# Patient Record
Sex: FEMALE | Race: WHITE | NOT HISPANIC OR LATINO | Employment: OTHER | URBAN - METROPOLITAN AREA
[De-identification: names, ages, dates, MRNs, and addresses within clinical notes are randomized per-mention and may not be internally consistent; named-entity substitution may affect disease eponyms.]

---

## 2023-12-01 ENCOUNTER — ANCILLARY PROCEDURE (OUTPATIENT)
Dept: RADIOLOGY | Facility: CLINIC | Age: 68
End: 2023-12-01
Payer: MEDICARE

## 2023-12-01 VITALS — HEIGHT: 62 IN | WEIGHT: 195 LBS | BODY MASS INDEX: 35.88 KG/M2

## 2023-12-01 DIAGNOSIS — Z12.39 ENCOUNTER FOR OTHER SCREENING FOR MALIGNANT NEOPLASM OF BREAST: ICD-10-CM

## 2023-12-01 PROCEDURE — 77067 SCR MAMMO BI INCL CAD: CPT | Performed by: RADIOLOGY

## 2023-12-01 PROCEDURE — 77067 SCR MAMMO BI INCL CAD: CPT

## 2023-12-01 PROCEDURE — 77063 BREAST TOMOSYNTHESIS BI: CPT | Performed by: RADIOLOGY

## 2024-12-04 ENCOUNTER — HOSPITAL ENCOUNTER (OUTPATIENT)
Dept: RADIOLOGY | Facility: CLINIC | Age: 69
Discharge: HOME | End: 2024-12-04
Payer: MEDICARE

## 2024-12-04 VITALS — WEIGHT: 190 LBS | HEIGHT: 62 IN | BODY MASS INDEX: 34.96 KG/M2

## 2024-12-04 DIAGNOSIS — Z12.39 ENCOUNTER FOR OTHER SCREENING FOR MALIGNANT NEOPLASM OF BREAST: ICD-10-CM

## 2024-12-04 PROCEDURE — 77063 BREAST TOMOSYNTHESIS BI: CPT | Performed by: RADIOLOGY

## 2024-12-04 PROCEDURE — 77067 SCR MAMMO BI INCL CAD: CPT | Performed by: RADIOLOGY

## 2024-12-04 PROCEDURE — 77067 SCR MAMMO BI INCL CAD: CPT

## 2025-03-17 ENCOUNTER — HOSPITAL ENCOUNTER (OUTPATIENT)
Dept: CARDIOLOGY | Facility: HOSPITAL | Age: 70
Discharge: HOME | End: 2025-03-17
Payer: MEDICARE

## 2025-03-17 ENCOUNTER — APPOINTMENT (OUTPATIENT)
Dept: RADIOLOGY | Facility: HOSPITAL | Age: 70
End: 2025-03-17
Payer: MEDICARE

## 2025-03-17 ENCOUNTER — APPOINTMENT (OUTPATIENT)
Dept: CARDIOLOGY | Facility: HOSPITAL | Age: 70
End: 2025-03-17
Payer: MEDICARE

## 2025-03-17 ENCOUNTER — HOSPITAL ENCOUNTER (OUTPATIENT)
Facility: HOSPITAL | Age: 70
Setting detail: OBSERVATION
Discharge: HOME | End: 2025-03-18
Attending: STUDENT IN AN ORGANIZED HEALTH CARE EDUCATION/TRAINING PROGRAM | Admitting: STUDENT IN AN ORGANIZED HEALTH CARE EDUCATION/TRAINING PROGRAM
Payer: MEDICARE

## 2025-03-17 DIAGNOSIS — R53.1 EPISODE OF GENERALIZED WEAKNESS: ICD-10-CM

## 2025-03-17 DIAGNOSIS — R00.2 PALPITATIONS: Primary | ICD-10-CM

## 2025-03-17 DIAGNOSIS — R61 DIAPHORESIS: ICD-10-CM

## 2025-03-17 DIAGNOSIS — R07.9 CHEST PAIN: ICD-10-CM

## 2025-03-17 DIAGNOSIS — R06.09 DOE (DYSPNEA ON EXERTION): ICD-10-CM

## 2025-03-17 PROBLEM — F32.A DEPRESSION: Status: ACTIVE | Noted: 2025-03-17

## 2025-03-17 PROBLEM — E78.5 HLD (HYPERLIPIDEMIA): Status: ACTIVE | Noted: 2025-03-17

## 2025-03-17 LAB
ALBUMIN SERPL BCP-MCNC: 4.4 G/DL (ref 3.4–5)
ALP SERPL-CCNC: 75 U/L (ref 33–136)
ALT SERPL W P-5'-P-CCNC: 18 U/L (ref 7–45)
ANION GAP SERPL CALC-SCNC: 15 MMOL/L (ref 10–20)
APPEARANCE UR: CLEAR
APTT PPP: 31 SECONDS (ref 26–36)
AST SERPL W P-5'-P-CCNC: 18 U/L (ref 9–39)
BASOPHILS # BLD AUTO: 0.02 X10*3/UL (ref 0–0.1)
BASOPHILS NFR BLD AUTO: 0.2 %
BILIRUB SERPL-MCNC: 0.4 MG/DL (ref 0–1.2)
BILIRUB UR STRIP.AUTO-MCNC: NEGATIVE MG/DL
BNP SERPL-MCNC: 20 PG/ML (ref 0–99)
BUN SERPL-MCNC: 15 MG/DL (ref 6–23)
CALCIUM SERPL-MCNC: 9.7 MG/DL (ref 8.6–10.3)
CARDIAC TROPONIN I PNL SERPL HS: <3 NG/L (ref 0–13)
CARDIAC TROPONIN I PNL SERPL HS: <3 NG/L (ref 0–13)
CHLORIDE SERPL-SCNC: 105 MMOL/L (ref 98–107)
CO2 SERPL-SCNC: 24 MMOL/L (ref 21–32)
COLOR UR: COLORLESS
CREAT SERPL-MCNC: 0.79 MG/DL (ref 0.5–1.05)
D DIMER PPP FEU-MCNC: 455 NG/ML FEU
EGFRCR SERPLBLD CKD-EPI 2021: 81 ML/MIN/1.73M*2
EOSINOPHIL # BLD AUTO: 0.01 X10*3/UL (ref 0–0.7)
EOSINOPHIL NFR BLD AUTO: 0.1 %
ERYTHROCYTE [DISTWIDTH] IN BLOOD BY AUTOMATED COUNT: 13.3 % (ref 11.5–14.5)
FLUAV RNA RESP QL NAA+PROBE: NOT DETECTED
FLUBV RNA RESP QL NAA+PROBE: NOT DETECTED
GLUCOSE SERPL-MCNC: 120 MG/DL (ref 74–99)
GLUCOSE UR STRIP.AUTO-MCNC: ABNORMAL MG/DL
HCT VFR BLD AUTO: 46.6 % (ref 36–46)
HGB BLD-MCNC: 15.4 G/DL (ref 12–16)
IMM GRANULOCYTES # BLD AUTO: 0.04 X10*3/UL (ref 0–0.7)
IMM GRANULOCYTES NFR BLD AUTO: 0.5 % (ref 0–0.9)
INR PPP: 1.1 (ref 0.9–1.1)
KETONES UR STRIP.AUTO-MCNC: NEGATIVE MG/DL
LEUKOCYTE ESTERASE UR QL STRIP.AUTO: NEGATIVE
LYMPHOCYTES # BLD AUTO: 1.35 X10*3/UL (ref 1.2–4.8)
LYMPHOCYTES NFR BLD AUTO: 16.1 %
MAGNESIUM SERPL-MCNC: 2.11 MG/DL (ref 1.6–2.4)
MCH RBC QN AUTO: 26.8 PG (ref 26–34)
MCHC RBC AUTO-ENTMCNC: 33 G/DL (ref 32–36)
MCV RBC AUTO: 81 FL (ref 80–100)
MONOCYTES # BLD AUTO: 0.31 X10*3/UL (ref 0.1–1)
MONOCYTES NFR BLD AUTO: 3.7 %
NEUTROPHILS # BLD AUTO: 6.64 X10*3/UL (ref 1.2–7.7)
NEUTROPHILS NFR BLD AUTO: 79.4 %
NITRITE UR QL STRIP.AUTO: NEGATIVE
NRBC BLD-RTO: 0 /100 WBCS (ref 0–0)
PH UR STRIP.AUTO: 6 [PH]
PLATELET # BLD AUTO: 217 X10*3/UL (ref 150–450)
POTASSIUM SERPL-SCNC: 3.7 MMOL/L (ref 3.5–5.3)
PROT SERPL-MCNC: 7.4 G/DL (ref 6.4–8.2)
PROT UR STRIP.AUTO-MCNC: NEGATIVE MG/DL
PROTHROMBIN TIME: 12.4 SECONDS (ref 9.8–12.4)
RBC # BLD AUTO: 5.74 X10*6/UL (ref 4–5.2)
RBC # UR STRIP.AUTO: NEGATIVE MG/DL
SARS-COV-2 RNA RESP QL NAA+PROBE: NOT DETECTED
SODIUM SERPL-SCNC: 140 MMOL/L (ref 136–145)
SP GR UR STRIP.AUTO: 1
UROBILINOGEN UR STRIP.AUTO-MCNC: NORMAL MG/DL
WBC # BLD AUTO: 8.4 X10*3/UL (ref 4.4–11.3)

## 2025-03-17 PROCEDURE — 83735 ASSAY OF MAGNESIUM: CPT

## 2025-03-17 PROCEDURE — 84484 ASSAY OF TROPONIN QUANT: CPT

## 2025-03-17 PROCEDURE — 85610 PROTHROMBIN TIME: CPT

## 2025-03-17 PROCEDURE — 83880 ASSAY OF NATRIURETIC PEPTIDE: CPT

## 2025-03-17 PROCEDURE — 87636 SARSCOV2 & INF A&B AMP PRB: CPT

## 2025-03-17 PROCEDURE — G0378 HOSPITAL OBSERVATION PER HR: HCPCS

## 2025-03-17 PROCEDURE — 85730 THROMBOPLASTIN TIME PARTIAL: CPT

## 2025-03-17 PROCEDURE — 71045 X-RAY EXAM CHEST 1 VIEW: CPT | Performed by: RADIOLOGY

## 2025-03-17 PROCEDURE — 93005 ELECTROCARDIOGRAM TRACING: CPT

## 2025-03-17 PROCEDURE — 71045 X-RAY EXAM CHEST 1 VIEW: CPT

## 2025-03-17 PROCEDURE — 2500000002 HC RX 250 W HCPCS SELF ADMINISTERED DRUGS (ALT 637 FOR MEDICARE OP, ALT 636 FOR OP/ED): Mod: MUE | Performed by: STUDENT IN AN ORGANIZED HEALTH CARE EDUCATION/TRAINING PROGRAM

## 2025-03-17 PROCEDURE — 99285 EMERGENCY DEPT VISIT HI MDM: CPT | Performed by: STUDENT IN AN ORGANIZED HEALTH CARE EDUCATION/TRAINING PROGRAM

## 2025-03-17 PROCEDURE — 80053 COMPREHEN METABOLIC PANEL: CPT

## 2025-03-17 PROCEDURE — 85379 FIBRIN DEGRADATION QUANT: CPT

## 2025-03-17 PROCEDURE — 99222 1ST HOSP IP/OBS MODERATE 55: CPT | Performed by: STUDENT IN AN ORGANIZED HEALTH CARE EDUCATION/TRAINING PROGRAM

## 2025-03-17 PROCEDURE — 85025 COMPLETE CBC W/AUTO DIFF WBC: CPT

## 2025-03-17 PROCEDURE — 2500000004 HC RX 250 GENERAL PHARMACY W/ HCPCS (ALT 636 FOR OP/ED): Performed by: STUDENT IN AN ORGANIZED HEALTH CARE EDUCATION/TRAINING PROGRAM

## 2025-03-17 PROCEDURE — 36415 COLL VENOUS BLD VENIPUNCTURE: CPT

## 2025-03-17 PROCEDURE — 96372 THER/PROPH/DIAG INJ SC/IM: CPT | Performed by: STUDENT IN AN ORGANIZED HEALTH CARE EDUCATION/TRAINING PROGRAM

## 2025-03-17 PROCEDURE — 81003 URINALYSIS AUTO W/O SCOPE: CPT

## 2025-03-17 RX ORDER — SIMVASTATIN 20 MG/1
20 TABLET, FILM COATED ORAL NIGHTLY
COMMUNITY

## 2025-03-17 RX ORDER — TALC
3 POWDER (GRAM) TOPICAL NIGHTLY PRN
Status: DISCONTINUED | OUTPATIENT
Start: 2025-03-17 | End: 2025-03-18 | Stop reason: HOSPADM

## 2025-03-17 RX ORDER — GUAIFENESIN 600 MG/1
600 TABLET, EXTENDED RELEASE ORAL EVERY 12 HOURS PRN
Status: DISCONTINUED | OUTPATIENT
Start: 2025-03-17 | End: 2025-03-18 | Stop reason: HOSPADM

## 2025-03-17 RX ORDER — POTASSIUM CHLORIDE 20 MEQ/1
40 TABLET, EXTENDED RELEASE ORAL ONCE
Status: COMPLETED | OUTPATIENT
Start: 2025-03-17 | End: 2025-03-17

## 2025-03-17 RX ORDER — ENOXAPARIN SODIUM 100 MG/ML
40 INJECTION SUBCUTANEOUS EVERY 24 HOURS
Status: DISCONTINUED | OUTPATIENT
Start: 2025-03-17 | End: 2025-03-18 | Stop reason: HOSPADM

## 2025-03-17 RX ORDER — CALC/MAG/B COMPLEX/D3/HERB 61
15 TABLET ORAL
COMMUNITY

## 2025-03-17 RX ORDER — BISACODYL 5 MG
10 TABLET, DELAYED RELEASE (ENTERIC COATED) ORAL DAILY PRN
Status: DISCONTINUED | OUTPATIENT
Start: 2025-03-17 | End: 2025-03-18 | Stop reason: HOSPADM

## 2025-03-17 RX ORDER — BISMUTH SUBSALICYLATE 262 MG
1 TABLET,CHEWABLE ORAL DAILY
COMMUNITY

## 2025-03-17 RX ORDER — OMEGA-3-ACID ETHYL ESTERS 1 G/1
1 CAPSULE, LIQUID FILLED ORAL DAILY
COMMUNITY

## 2025-03-17 RX ORDER — CEPHRADINE 500 MG
1000 CAPSULE ORAL DAILY
COMMUNITY

## 2025-03-17 RX ORDER — ONDANSETRON HYDROCHLORIDE 2 MG/ML
4 INJECTION, SOLUTION INTRAVENOUS EVERY 8 HOURS PRN
Status: DISCONTINUED | OUTPATIENT
Start: 2025-03-17 | End: 2025-03-18 | Stop reason: HOSPADM

## 2025-03-17 RX ORDER — SIMVASTATIN 20 MG/1
20 TABLET, FILM COATED ORAL NIGHTLY
Status: DISCONTINUED | OUTPATIENT
Start: 2025-03-17 | End: 2025-03-18 | Stop reason: HOSPADM

## 2025-03-17 RX ORDER — PAROXETINE HYDROCHLORIDE 20 MG/1
20 TABLET, FILM COATED ORAL
COMMUNITY
Start: 2024-12-16

## 2025-03-17 RX ORDER — BISACODYL 10 MG/1
10 SUPPOSITORY RECTAL DAILY PRN
Status: DISCONTINUED | OUTPATIENT
Start: 2025-03-17 | End: 2025-03-18 | Stop reason: HOSPADM

## 2025-03-17 RX ORDER — PAROXETINE HYDROCHLORIDE 20 MG/1
20 TABLET, FILM COATED ORAL
Status: DISCONTINUED | OUTPATIENT
Start: 2025-03-18 | End: 2025-03-18 | Stop reason: HOSPADM

## 2025-03-17 RX ORDER — ONDANSETRON 4 MG/1
4 TABLET, FILM COATED ORAL EVERY 8 HOURS PRN
Status: DISCONTINUED | OUTPATIENT
Start: 2025-03-17 | End: 2025-03-18 | Stop reason: HOSPADM

## 2025-03-17 RX ORDER — BUTYROSPERMUM PARKII(SHEA BUTTER), SIMMONDSIA CHINENSIS (JOJOBA) SEED OIL, ALOE BARBADENSIS LEAF EXTRACT .01; 1; 3.5 G/100G; G/100G; G/100G
250 LIQUID TOPICAL DAILY
COMMUNITY

## 2025-03-17 RX ADMIN — ENOXAPARIN SODIUM 40 MG: 100 INJECTION SUBCUTANEOUS at 23:47

## 2025-03-17 RX ADMIN — POTASSIUM CHLORIDE 40 MEQ: 1500 TABLET, EXTENDED RELEASE ORAL at 23:47

## 2025-03-17 SDOH — SOCIAL STABILITY: SOCIAL INSECURITY: HAVE YOU HAD THOUGHTS OF HARMING ANYONE ELSE?: NO

## 2025-03-17 SDOH — SOCIAL STABILITY: SOCIAL INSECURITY: WERE YOU ABLE TO COMPLETE ALL THE BEHAVIORAL HEALTH SCREENINGS?: YES

## 2025-03-17 ASSESSMENT — COGNITIVE AND FUNCTIONAL STATUS - GENERAL
PATIENT BASELINE BEDBOUND: NO
MOBILITY SCORE: 24
MOBILITY SCORE: 24
DAILY ACTIVITIY SCORE: 24
DAILY ACTIVITIY SCORE: 24

## 2025-03-17 ASSESSMENT — LIFESTYLE VARIABLES
AUDIT-C TOTAL SCORE: 0
HOW OFTEN DO YOU HAVE 6 OR MORE DRINKS ON ONE OCCASION: NEVER
HOW MANY STANDARD DRINKS CONTAINING ALCOHOL DO YOU HAVE ON A TYPICAL DAY: PATIENT DOES NOT DRINK
HOW OFTEN DO YOU HAVE A DRINK CONTAINING ALCOHOL: NEVER
SUBSTANCE_ABUSE_PAST_12_MONTHS: NO
SKIP TO QUESTIONS 9-10: 1
AUDIT-C TOTAL SCORE: 0
PRESCIPTION_ABUSE_PAST_12_MONTHS: NO

## 2025-03-17 ASSESSMENT — ENCOUNTER SYMPTOMS
NAUSEA: 0
NERVOUS/ANXIOUS: 0
WOUND: 0
HEMATURIA: 0
MYALGIAS: 0
CHEST TIGHTNESS: 0
AGITATION: 0
COUGH: 0
DIFFICULTY URINATING: 0
CONSTIPATION: 0
FREQUENCY: 0
FLANK PAIN: 0
RHINORRHEA: 0
ACTIVITY CHANGE: 0
APPETITE CHANGE: 0
HEADACHES: 0
CONFUSION: 0
VOMITING: 0
SORE THROAT: 0
DIAPHORESIS: 1
SHORTNESS OF BREATH: 0
BACK PAIN: 0
DECREASED CONCENTRATION: 0
SINUS PAIN: 0
WEAKNESS: 0
COLOR CHANGE: 0
ABDOMINAL DISTENTION: 0
CHILLS: 0
NUMBNESS: 0
FATIGUE: 1
LIGHT-HEADEDNESS: 0
JOINT SWELLING: 0
STRIDOR: 0
PALPITATIONS: 1
DIARRHEA: 0
WHEEZING: 0
DIZZINESS: 0
DYSURIA: 0
ABDOMINAL PAIN: 0
APNEA: 0
ARTHRALGIAS: 0
FEVER: 0

## 2025-03-17 ASSESSMENT — COLUMBIA-SUICIDE SEVERITY RATING SCALE - C-SSRS
1. IN THE PAST MONTH, HAVE YOU WISHED YOU WERE DEAD OR WISHED YOU COULD GO TO SLEEP AND NOT WAKE UP?: NO
6. HAVE YOU EVER DONE ANYTHING, STARTED TO DO ANYTHING, OR PREPARED TO DO ANYTHING TO END YOUR LIFE?: NO
2. HAVE YOU ACTUALLY HAD ANY THOUGHTS OF KILLING YOURSELF?: NO

## 2025-03-17 ASSESSMENT — PAIN SCALES - GENERAL
PAINLEVEL_OUTOF10: 0 - NO PAIN
PAINLEVEL_OUTOF10: 0 - NO PAIN

## 2025-03-17 ASSESSMENT — ACTIVITIES OF DAILY LIVING (ADL)
HEARING - LEFT EAR: FUNCTIONAL
ADEQUATE_TO_COMPLETE_ADL: YES
WALKS IN HOME: INDEPENDENT
FEEDING YOURSELF: INDEPENDENT
GROOMING: INDEPENDENT
HEARING - RIGHT EAR: FUNCTIONAL
PATIENT'S MEMORY ADEQUATE TO SAFELY COMPLETE DAILY ACTIVITIES?: YES
TOILETING: INDEPENDENT
BATHING: INDEPENDENT
JUDGMENT_ADEQUATE_SAFELY_COMPLETE_DAILY_ACTIVITIES: YES
DRESSING YOURSELF: INDEPENDENT
LACK_OF_TRANSPORTATION: NO

## 2025-03-17 ASSESSMENT — PATIENT HEALTH QUESTIONNAIRE - PHQ9
1. LITTLE INTEREST OR PLEASURE IN DOING THINGS: NOT AT ALL
2. FEELING DOWN, DEPRESSED OR HOPELESS: NOT AT ALL
SUM OF ALL RESPONSES TO PHQ9 QUESTIONS 1 & 2: 0

## 2025-03-17 ASSESSMENT — PAIN - FUNCTIONAL ASSESSMENT
PAIN_FUNCTIONAL_ASSESSMENT: 0-10
PAIN_FUNCTIONAL_ASSESSMENT: 0-10

## 2025-03-17 NOTE — ED PROVIDER NOTES
"  Chief Complaint   Patient presents with    Dizziness     Pt states 5 episodes of dizziness, sweating and feeling \"jittery\" today.  Pt denies any Chest pain or shob        69-year-old female arrives to the emergency department the chief complaint of periods of palpitation, diaphoresis, lethargy.  The patient woke this morning feeling well, went to work at 830, approximately 830 had her first episode of what she complains as her heart fluttering in her chest, sweating to the point of sweat dripping from both sides of her brow, and lethargy where she felt listless.  Patient states that she would have an episode that lasted 10 to 15 minutes, partially resolved, and repeated self having 4 episodes from 8 30-11 30.  Patient went home, states that she felt faint while walking to her car, drove herself home and had another episode at approximately 1300 that was worse than all subsequent episodes.  These are new for the patient, however the patient does state that over the last year has felt intermittent fluttering in her chest.  At the time of initial assessment, the patient states that all her symptoms have resolved, the patient is pleasant, alert and orient x 4, and hemodynamically stable.  At no time did the patient endorse any chest pain however states that while she was having the fluttering she had difficulty catching her breath.  Patient has hyperlipidemia and depression as a medical history, denies any other significant medical history or other daily medications      History provided by:  Patient   used: No         PmHx, PsHx, Allergies, Family Hx, social Hx reviewed as documented    A complete 10 point review of systems was performed and is negative except for as mentioned in the HPI.    Physical Exam:    General: Patient is AAOx3, appears well developed, well nourished, is a good historian, answers questions appropriately    HEENT: head normocephalic, atraumatic, PERRLA, EOMs intact, " oropharynx without erythema or exudate, buccal mucosa intact without lesions, TMs unremarkable, nose is patent bilateral    Neck: supple, full ROM, negative for lymphadenopathy, JVD, thyromegaly, tracheal deviation, nuccal rigidity    Pulmonary: CTAB, no accessory muscle use, able to speak full clear sentences    Cardiac: HRRR, no murmurs, rubs or gallops    GI: soft, non-tender, non-distended, BS + x 4, no masses or organomegaly, no guarding or CVA tenderness noted, negative chairez's, mcburney's    Musculoskeletal: full weight bearing, ALVAREZ, no joint effusions, clubbing or edema noted    Skin: intact, no lesions or rashes noted, turgor is good.    Neuro: patient follow commands, cranial nerves 2-12 grossly intact, motor strengths 5/5 upper and lower extremities, DTR's and sensation are symmetrical. No focal deficits.    Rectal/: No urinary burning, urgency, change in frequency.  Patient has no rectal complaints        Medical Decision Making  This patient was seen, treated, and evaluated in conjunction with Dr. Ej Arana    Primary consideration for this patient given her presentation as well as her otherwise being healthy would be periods of arrhythmia such as atrial fibrillation, acute ACS, pulmonary embolism, electrolyte abnormality, blood count deficiency to name a few.  EKG, diagnostic blood work, urinalysis will be used to further evaluate    Patient's EKG was done on 3/17 at 1406, the EKG was interpreted by the attending physician as no STEMI, the EKG is interpreted by me shows a sinus rhythm with a rate of 75, WV interval of 144, and a prolonged QTc of 585 which is further concerning for possible episodic arrhythmia.     Patient moved from triage to main emergency department, placed on a cardiac monitor, approximately 30 minutes after being placed on cardiac monitor the patient states that she had another episode similar to that of the past lasting about 5 minutes, the patient endorsed diaphoresis,  lethargy, fluttering in the chest, continues to deny any chest pain.    The patient's diagnostic blood work including serial troponins and BNP were found to be negative for any acute abnormality including D-dimer VTE exclusion that was also negative    The patient's repeat EKG was done at 2038 on 3/17, the EKG was interpreted by the attending physician as no STEMI, the EKG is interpreted by me shows a sinus rhythm with a rate of 70, UT interval of 143 and a QTc of 421 which was found earlier to be prolonged    Although the patient's ED course is benign for acute abnormality, given the patient had an episode here in the emergency department, the patient has never had these in the past, and is otherwise healthy, patient will be further evaluated via observation stay.  Dr. Constantin Deng was consulted, the patient's events prior to arrival as well as her entire ED course were reviewed.  Patient will be kept on observation telemetry.        Amount and/or Complexity of Data Reviewed  Labs: ordered. Decision-making details documented in ED Course.  Radiology: ordered. Decision-making details documented in ED Course.  ECG/medicine tests: ordered. Decision-making details documented in ED Course.       Diagnoses as of 03/17/25 2056   Palpitations   Diaphoresis   Episode of generalized weakness       The patient has had the following imaging during this ER visit: XR CHEST 1 VIEW  INITIATE OBSERVATION STATUS  ED TO FLOOR BED REQUEST  MAY PARTICIPATE IN ROOM SERVICE  WEIGH PATIENT  MEASURE HEIGHT  ACTIVITY  VITAL SIGNS  SEQUENTIAL COMPRESSION DEVICE  FULL CODE  ADULT DIET  TELEMETRY MONITORING  IP CONSULT TO CARDIOLOGY     Patient History   History reviewed. No pertinent past medical history.  History reviewed. No pertinent surgical history.  Family History   Problem Relation Name Age of Onset    Breast cancer Mother  88    Ovarian cancer Mother's Sister  80     Social History     Tobacco Use    Smoking status: Never    Smokeless  tobacco: Never   Vaping Use    Vaping status: Never Used   Substance Use Topics    Alcohol use: Not on file    Drug use: Never       ED Triage Vitals [03/17/25 1355]   Temperature Heart Rate Respirations BP   37.2 °C (98.9 °F) 78 15 154/90      Pulse Ox Temp Source Heart Rate Source Patient Position   98 % Oral Monitor --      BP Location FiO2 (%)     -- --       Vitals:    03/17/25 1800 03/17/25 1921 03/17/25 2000 03/17/25 2011   BP: 135/65 147/80  117/77   Pulse: 77 80 73 71   Resp: (!) 26 11 (!) 21 12   Temp:       TempSrc:       SpO2: 98% 95% 96% 95%   Weight:       Height:                   PRAVIN Delaney-CNP  03/17/25 2057

## 2025-03-18 ENCOUNTER — APPOINTMENT (OUTPATIENT)
Dept: CARDIOLOGY | Facility: HOSPITAL | Age: 70
End: 2025-03-18
Payer: MEDICARE

## 2025-03-18 VITALS
OXYGEN SATURATION: 91 % | DIASTOLIC BLOOD PRESSURE: 78 MMHG | RESPIRATION RATE: 18 BRPM | HEIGHT: 63 IN | WEIGHT: 190 LBS | BODY MASS INDEX: 33.66 KG/M2 | TEMPERATURE: 97.6 F | SYSTOLIC BLOOD PRESSURE: 115 MMHG | HEART RATE: 83 BPM

## 2025-03-18 LAB
ANION GAP SERPL CALC-SCNC: 10 MMOL/L (ref 10–20)
AORTIC VALVE MEAN GRADIENT: 4 MMHG
AORTIC VALVE PEAK VELOCITY: 1.27 M/S
AV PEAK GRADIENT: 6 MMHG
AVA (PEAK VEL): 2.08 CM2
AVA (VTI): 1.99 CM2
BUN SERPL-MCNC: 15 MG/DL (ref 6–23)
CALCIUM SERPL-MCNC: 8.9 MG/DL (ref 8.6–10.3)
CHLORIDE SERPL-SCNC: 110 MMOL/L (ref 98–107)
CO2 SERPL-SCNC: 26 MMOL/L (ref 21–32)
CREAT SERPL-MCNC: 0.81 MG/DL (ref 0.5–1.05)
EGFRCR SERPLBLD CKD-EPI 2021: 79 ML/MIN/1.73M*2
EJECTION FRACTION APICAL 4 CHAMBER: 67.5
EJECTION FRACTION: 63 %
ERYTHROCYTE [DISTWIDTH] IN BLOOD BY AUTOMATED COUNT: 13.6 % (ref 11.5–14.5)
GLUCOSE SERPL-MCNC: 95 MG/DL (ref 74–99)
HCT VFR BLD AUTO: 40.1 % (ref 36–46)
HGB BLD-MCNC: 13.2 G/DL (ref 12–16)
HOLD SPECIMEN: NORMAL
LEFT ATRIUM VOLUME AREA LENGTH INDEX BSA: 26.5 ML/M2
LEFT VENTRICLE INTERNAL DIMENSION DIASTOLE: 4.2 CM (ref 3.5–6)
LEFT VENTRICULAR OUTFLOW TRACT DIAMETER: 1.8 CM
LV EJECTION FRACTION BIPLANE: 63 %
MAGNESIUM SERPL-MCNC: 2.14 MG/DL (ref 1.6–2.4)
MCH RBC QN AUTO: 27.1 PG (ref 26–34)
MCHC RBC AUTO-ENTMCNC: 32.9 G/DL (ref 32–36)
MCV RBC AUTO: 82 FL (ref 80–100)
MITRAL VALVE E/A RATIO: 1.01
NRBC BLD-RTO: 0 /100 WBCS (ref 0–0)
PLATELET # BLD AUTO: 189 X10*3/UL (ref 150–450)
POTASSIUM SERPL-SCNC: 3.8 MMOL/L (ref 3.5–5.3)
RBC # BLD AUTO: 4.87 X10*6/UL (ref 4–5.2)
RIGHT VENTRICLE FREE WALL PEAK S': 19.6 CM/S
RIGHT VENTRICLE PEAK SYSTOLIC PRESSURE: 39 MMHG
SODIUM SERPL-SCNC: 142 MMOL/L (ref 136–145)
TRICUSPID ANNULAR PLANE SYSTOLIC EXCURSION: 2.6 CM
TSH SERPL-ACNC: 3.5 MIU/L (ref 0.44–3.98)
WBC # BLD AUTO: 8 X10*3/UL (ref 4.4–11.3)

## 2025-03-18 PROCEDURE — 2500000004 HC RX 250 GENERAL PHARMACY W/ HCPCS (ALT 636 FOR OP/ED)

## 2025-03-18 PROCEDURE — 2500000002 HC RX 250 W HCPCS SELF ADMINISTERED DRUGS (ALT 637 FOR MEDICARE OP, ALT 636 FOR OP/ED): Mod: MUE | Performed by: STUDENT IN AN ORGANIZED HEALTH CARE EDUCATION/TRAINING PROGRAM

## 2025-03-18 PROCEDURE — 93306 TTE W/DOPPLER COMPLETE: CPT | Performed by: INTERNAL MEDICINE

## 2025-03-18 PROCEDURE — 99222 1ST HOSP IP/OBS MODERATE 55: CPT | Performed by: INTERNAL MEDICINE

## 2025-03-18 PROCEDURE — 93018 CV STRESS TEST I&R ONLY: CPT | Performed by: INTERNAL MEDICINE

## 2025-03-18 PROCEDURE — 93350 STRESS TTE ONLY: CPT | Performed by: INTERNAL MEDICINE

## 2025-03-18 PROCEDURE — 83735 ASSAY OF MAGNESIUM: CPT | Performed by: STUDENT IN AN ORGANIZED HEALTH CARE EDUCATION/TRAINING PROGRAM

## 2025-03-18 PROCEDURE — 2500000004 HC RX 250 GENERAL PHARMACY W/ HCPCS (ALT 636 FOR OP/ED): Performed by: INTERNAL MEDICINE

## 2025-03-18 PROCEDURE — 36415 COLL VENOUS BLD VENIPUNCTURE: CPT | Performed by: STUDENT IN AN ORGANIZED HEALTH CARE EDUCATION/TRAINING PROGRAM

## 2025-03-18 PROCEDURE — 85027 COMPLETE CBC AUTOMATED: CPT | Performed by: STUDENT IN AN ORGANIZED HEALTH CARE EDUCATION/TRAINING PROGRAM

## 2025-03-18 PROCEDURE — 99239 HOSP IP/OBS DSCHRG MGMT >30: CPT | Performed by: INTERNAL MEDICINE

## 2025-03-18 PROCEDURE — 84443 ASSAY THYROID STIM HORMONE: CPT | Performed by: INTERNAL MEDICINE

## 2025-03-18 PROCEDURE — C8929 TTE W OR WO FOL WCON,DOPPLER: HCPCS

## 2025-03-18 PROCEDURE — 93017 CV STRESS TEST TRACING ONLY: CPT

## 2025-03-18 PROCEDURE — 93016 CV STRESS TEST SUPVJ ONLY: CPT | Performed by: INTERNAL MEDICINE

## 2025-03-18 PROCEDURE — 80048 BASIC METABOLIC PNL TOTAL CA: CPT | Performed by: STUDENT IN AN ORGANIZED HEALTH CARE EDUCATION/TRAINING PROGRAM

## 2025-03-18 PROCEDURE — G0378 HOSPITAL OBSERVATION PER HR: HCPCS

## 2025-03-18 RX ADMIN — HUMAN ALBUMIN MICROSPHERES AND PERFLUTREN: 10; .22 INJECTION, SOLUTION INTRAVENOUS at 10:39

## 2025-03-18 RX ADMIN — PERFLUTREN 8 ML OF DILUTION: 6.52 INJECTION, SUSPENSION INTRAVENOUS at 12:03

## 2025-03-18 RX ADMIN — PAROXETINE 20 MG: 20 TABLET, FILM COATED ORAL at 06:53

## 2025-03-18 SDOH — HEALTH STABILITY: PHYSICAL HEALTH
HOW OFTEN DO YOU NEED TO HAVE SOMEONE HELP YOU WHEN YOU READ INSTRUCTIONS, PAMPHLETS, OR OTHER WRITTEN MATERIAL FROM YOUR DOCTOR OR PHARMACY?: PATIENT DECLINES TO RESPOND

## 2025-03-18 SDOH — ECONOMIC STABILITY: TRANSPORTATION INSECURITY: IN THE PAST 12 MONTHS, HAS LACK OF TRANSPORTATION KEPT YOU FROM MEDICAL APPOINTMENTS OR FROM GETTING MEDICATIONS?: NO

## 2025-03-18 SDOH — SOCIAL STABILITY: SOCIAL NETWORK
DO YOU BELONG TO ANY CLUBS OR ORGANIZATIONS SUCH AS CHURCH GROUPS, UNIONS, FRATERNAL OR ATHLETIC GROUPS, OR SCHOOL GROUPS?: PATIENT DECLINED

## 2025-03-18 SDOH — ECONOMIC STABILITY: FOOD INSECURITY
WITHIN THE PAST 12 MONTHS, YOU WORRIED THAT YOUR FOOD WOULD RUN OUT BEFORE YOU GOT THE MONEY TO BUY MORE.: PATIENT DECLINED

## 2025-03-18 SDOH — SOCIAL STABILITY: SOCIAL INSECURITY
WITHIN THE LAST YEAR, HAVE YOU BEEN KICKED, HIT, SLAPPED, OR OTHERWISE PHYSICALLY HURT BY YOUR PARTNER OR EX-PARTNER?: PATIENT DECLINED

## 2025-03-18 SDOH — ECONOMIC STABILITY: HOUSING INSECURITY: AT ANY TIME IN THE PAST 12 MONTHS, WERE YOU HOMELESS OR LIVING IN A SHELTER (INCLUDING NOW)?: NO

## 2025-03-18 SDOH — SOCIAL STABILITY: SOCIAL NETWORK: HOW OFTEN DO YOU ATTEND CHURCH OR RELIGIOUS SERVICES?: PATIENT DECLINED

## 2025-03-18 SDOH — SOCIAL STABILITY: SOCIAL INSECURITY
WITHIN THE LAST YEAR, HAVE YOU BEEN RAPED OR FORCED TO HAVE ANY KIND OF SEXUAL ACTIVITY BY YOUR PARTNER OR EX-PARTNER?: PATIENT DECLINED

## 2025-03-18 SDOH — ECONOMIC STABILITY: FOOD INSECURITY: WITHIN THE PAST 12 MONTHS, THE FOOD YOU BOUGHT JUST DIDN'T LAST AND YOU DIDN'T HAVE MONEY TO GET MORE.: PATIENT DECLINED

## 2025-03-18 SDOH — SOCIAL STABILITY: SOCIAL INSECURITY: ARE YOU MARRIED, WIDOWED, DIVORCED, SEPARATED, NEVER MARRIED, OR LIVING WITH A PARTNER?: PATIENT DECLINED

## 2025-03-18 SDOH — ECONOMIC STABILITY: FOOD INSECURITY: HOW HARD IS IT FOR YOU TO PAY FOR THE VERY BASICS LIKE FOOD, HOUSING, MEDICAL CARE, AND HEATING?: PATIENT DECLINED

## 2025-03-18 SDOH — SOCIAL STABILITY: SOCIAL INSECURITY: WITHIN THE LAST YEAR, HAVE YOU BEEN AFRAID OF YOUR PARTNER OR EX-PARTNER?: PATIENT DECLINED

## 2025-03-18 SDOH — SOCIAL STABILITY: SOCIAL NETWORK: HOW OFTEN DO YOU ATTEND MEETINGS OF THE CLUBS OR ORGANIZATIONS YOU BELONG TO?: PATIENT DECLINED

## 2025-03-18 SDOH — HEALTH STABILITY: MENTAL HEALTH: HOW OFTEN DO YOU HAVE SIX OR MORE DRINKS ON ONE OCCASION?: NEVER

## 2025-03-18 SDOH — SOCIAL STABILITY: SOCIAL NETWORK: IN A TYPICAL WEEK, HOW MANY TIMES DO YOU TALK ON THE PHONE WITH FAMILY, FRIENDS, OR NEIGHBORS?: PATIENT DECLINED

## 2025-03-18 SDOH — HEALTH STABILITY: PHYSICAL HEALTH: ON AVERAGE, HOW MANY MINUTES DO YOU ENGAGE IN EXERCISE AT THIS LEVEL?: PATIENT DECLINED

## 2025-03-18 SDOH — ECONOMIC STABILITY: HOUSING INSECURITY: IN THE LAST 12 MONTHS, WAS THERE A TIME WHEN YOU WERE NOT ABLE TO PAY THE MORTGAGE OR RENT ON TIME?: PATIENT DECLINED

## 2025-03-18 SDOH — SOCIAL STABILITY: SOCIAL NETWORK: HOW OFTEN DO YOU GET TOGETHER WITH FRIENDS OR RELATIVES?: PATIENT DECLINED

## 2025-03-18 SDOH — ECONOMIC STABILITY: HOUSING INSECURITY: IN THE PAST 12 MONTHS, HOW MANY TIMES HAVE YOU MOVED WHERE YOU WERE LIVING?: 0

## 2025-03-18 SDOH — HEALTH STABILITY: MENTAL HEALTH
DO YOU FEEL STRESS - TENSE, RESTLESS, NERVOUS, OR ANXIOUS, OR UNABLE TO SLEEP AT NIGHT BECAUSE YOUR MIND IS TROUBLED ALL THE TIME - THESE DAYS?: PATIENT DECLINED

## 2025-03-18 SDOH — HEALTH STABILITY: MENTAL HEALTH: HOW MANY DRINKS CONTAINING ALCOHOL DO YOU HAVE ON A TYPICAL DAY WHEN YOU ARE DRINKING?: PATIENT DOES NOT DRINK

## 2025-03-18 SDOH — ECONOMIC STABILITY: INCOME INSECURITY
IN THE PAST 12 MONTHS HAS THE ELECTRIC, GAS, OIL, OR WATER COMPANY THREATENED TO SHUT OFF SERVICES IN YOUR HOME?: PATIENT DECLINED

## 2025-03-18 SDOH — HEALTH STABILITY: MENTAL HEALTH: HOW OFTEN DO YOU HAVE A DRINK CONTAINING ALCOHOL?: NEVER

## 2025-03-18 SDOH — SOCIAL STABILITY: SOCIAL INSECURITY
WITHIN THE LAST YEAR, HAVE YOU BEEN HUMILIATED OR EMOTIONALLY ABUSED IN OTHER WAYS BY YOUR PARTNER OR EX-PARTNER?: PATIENT DECLINED

## 2025-03-18 SDOH — HEALTH STABILITY: PHYSICAL HEALTH
ON AVERAGE, HOW MANY DAYS PER WEEK DO YOU ENGAGE IN MODERATE TO STRENUOUS EXERCISE (LIKE A BRISK WALK)?: PATIENT DECLINED

## 2025-03-18 ASSESSMENT — COGNITIVE AND FUNCTIONAL STATUS - GENERAL
MOBILITY SCORE: 24
DAILY ACTIVITIY SCORE: 24

## 2025-03-18 ASSESSMENT — PAIN SCALES - WONG BAKER: WONGBAKER_NUMERICALRESPONSE: NO HURT

## 2025-03-18 ASSESSMENT — PAIN - FUNCTIONAL ASSESSMENT
PAIN_FUNCTIONAL_ASSESSMENT: 0-10
PAIN_FUNCTIONAL_ASSESSMENT: 0-10

## 2025-03-18 ASSESSMENT — LIFESTYLE VARIABLES
AUDIT-C TOTAL SCORE: 0
SKIP TO QUESTIONS 9-10: 1

## 2025-03-18 ASSESSMENT — PAIN SCALES - GENERAL
PAINLEVEL_OUTOF10: 0 - NO PAIN
PAINLEVEL_OUTOF10: 0 - NO PAIN

## 2025-03-18 ASSESSMENT — ACTIVITIES OF DAILY LIVING (ADL): LACK_OF_TRANSPORTATION: NO

## 2025-03-18 NOTE — DISCHARGE SUMMARY
DISCHARGE SUMMARY     Discharge Diagnosis  Palpitations    This discharge took greater than 35 minutes.    Test Results Pending At Discharge  Pending Labs       No current pending labs.            Hospital Course   Yomaira Alanis is a 69 y.o. female with PMHx s/f HLD, depression presenting with palpitations. 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 She says she gets a fluttering feeling suddenly in her chest accompanied by face/upper body diaphoresis and a general feeling of malaise. These self terminate after about a minute or two. No syncope, lightheadedness, shortness of breath, overt chest pain, nausea, vomiting, abdominal pain or other symptoms. She had an episode this morning while at work She than had three more episodes at home and one more episode in the ER. In the ER she was on telemetry at the time and no arrhythmias were recorded per ER provider. Pt is on what sounds like an SSRI for depression, which was decreased from 40 mg to 20 mg daily per pt request. No known history of arrhythmias or cardiac issues in the past. Pt does not use tobacco, alcohol, or illicit drugs.     ED Course (Summary - please note all labs, imaging studies, and interventions noted below have been personally reviewed and/or interpreted on day of admission):   Vitals on presentation: T 37.2 °C (98.9 °F)  HR 78  /90  RR 15  O2 98 % None (Room air)  Labs:   CBC with WBC 8.4, Hgb 15.4, Plts 217.   CMP with glucose 120, Na 140, K 3.7, BUN 15, sCr 0.79, alk phos 75, ALT 18, AST 18, bilirubin 0.4. Magnesium 2.11.   BNP 20. Trop <3 X2.  INR 1.1  Flu and COVID PCR negative  UA colorless, 1+ glucose  D-dimer 455 (negative)  EKG: by my read, NSR with  ms. Initial EKG, not uploaded in MUSE, reportedly had a QTC >550 ms.  Imaging: CXR - No evidence of acute intrathoracic abnormality.   Interventions: Pt admitted to observation  for further care.     12-point ROS reviewed and found to be negative aside from aforementioned positives in HPI and/or noted in dedicated ROS section below.     Palpitations  Tele and ECG relatively unrevealing  TSH normal   Stress ECG normal   OP Holter  Cardiology consulted     Depression  Continue home Paxil at 20 ms. Qtc is normal.      HLD  Continue home statin.    Pertinent Physical Exam At Time of Discharge  Constitutional: Pleasant and cooperative. Laying in bed in no acute distress. Conversant.   Skin: Warm and dry; no obvious lesions, rashes, pallor, or jaundice.   Eyes: EOMI. Anicteric sclera.   ENT: Mucous membranes moist; no obvious injury or deformity appreciated.   Head and Neck: Normocephalic, atraumatic. ROM preserved. Trachea midline. No appreciable JVD.   Respiratory: Nonlabored on RA. Lungs clear to auscultation bilaterally without obvious adventitious sounds. Chest rise is equal.  Cardiovascular: RRR. No gross murmur, gallop, or rub. Extremities are warm and well-perfused with good capillary refill (< 3 seconds). No chest wall tenderness.   GI: Abdomen soft, nontender, nondistended. No obvious organomegaly appreciated. Bowel sounds are present.  : No CVA tenderness.   MSK: No gross abnormalities appreciated. No limitations to AROM/PROM appreciated.   Extremities: No cyanosis, edema, or clubbing evident. Neurovascularly intact.   Neuro: A&Ox3. CN 2-12 grossly intact. Able to respond to questions appropriately and clearly. No acute focal neurologic deficits appreciated.  Psych: Appropriate mood and behavior.    Home Medications     Medication List      CONTINUE taking these medications     K2 Plus D3 1,000-100 unit-mcg tablet; Generic drug: vitamin D3-vitamin   K2 (MK4)   lansoprazole 15 mg DR capsule; Commonly known as: Prevacid   multivitamin tablet   omega-3 acid ethyl esters 1 gram capsule; Commonly known as: Lovaza   PARoxetine 20 mg tablet; Commonly known as: Paxil   saccharomyces  boulardii 250 mg capsule; Commonly known as: Florastor   simvastatin 20 mg tablet; Commonly known as: Zocor       Outpatient Follow-Up  No follow-ups on file.     Garrett Foster MD PhD  3/18/2025  1:34 PM

## 2025-03-18 NOTE — PROGRESS NOTES
03/18/25 1216   Discharge Planning   Living Arrangements Spouse/significant other   Support Systems Spouse/significant other   Assistance Needed independent   Type of Residence Private residence   Home or Post Acute Services None   Expected Discharge Disposition Home   Does the patient need discharge transport arranged? No   Intensity of Service   Intensity of Service 0-30 min     Met with patient, spouse, introduced self and role as RN TCC. Patient lives at home with spouse. She is completely independent in her own care at baseline. Cooks, cleans, drives, showers self, manages her own medications, etc. PCP is Tiffani Maldonado. She is admitted for palpitations, echo stress to be completed and pending. Prefers to discharge home when medically ready. Denies needs at this time. TCC to follow.

## 2025-03-18 NOTE — H&P
[facility] - GENERAL MEDICINE HISTORY AND PHYSICAL    HISTORY OF PRESENT ILLNESS     History Obtained From (Primary Source): Patient  Collateral History (Secondary Sources): D/w ED physician    History Of Present Illness (HPI):  Yomaira Alanis is a 69 y.o. female with PMHx s/f HLD, depression presenting with palpitations. 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 She says she gets a fluttering feeling suddenly in her chest accompanied by face/upper body diaphoresis and a general feeling of malaise. These self terminate after about a minute or two. No syncope, lightheadedness, shortness of breath, overt chest pain, nausea, vomiting, abdominal pain or other symptoms. She had an episode this morning while at work She than had three more episodes at home and one more episode in the ER. In the ER she was on telemetry at the time and no arrhythmias were recorded per ER provider. Pt is on what sounds like an SSRI for depression, which was decreased from 40 mg to 20 mg daily per pt request. No known history of arrhythmias or cardiac issues in the past. Pt does not use tobacco, alcohol, or illicit drugs.    ED Course (Summary - please note all labs, imaging studies, and interventions noted below have been personally reviewed and/or interpreted on day of admission):   Vitals on presentation: T 37.2 °C (98.9 °F)  HR 78  /90  RR 15  O2 98 % None (Room air)  Labs:   CBC with WBC 8.4, Hgb 15.4, Plts 217.   CMP with glucose 120, Na 140, K 3.7, BUN 15, sCr 0.79, alk phos 75, ALT 18, AST 18, bilirubin 0.4. Magnesium 2.11.   BNP 20. Trop <3 X2.  INR 1.1  Flu and COVID PCR negative  UA colorless, 1+ glucose  D-dimer 455 (negative)  EKG: by my read, NSR with  ms. Initial EKG, not uploaded in MUSE, reportedly had a QTC >550 ms.  Imaging: CXR - No evidence of acute intrathoracic abnormality.   Interventions:  Pt admitted to observation for further care.    12-point ROS reviewed and found to be negative aside from aforementioned positives in HPI and/or noted in dedicated ROS section below.     LABS AND IMAGING     ED Course (From ED Provider):  Diagnoses as of 03/17/25 2104   Palpitations   Diaphoresis   Episode of generalized weakness     Relevant Results  Results for orders placed or performed during the hospital encounter of 03/17/25 (from the past 24 hours)   CBC and Auto Differential   Result Value Ref Range    WBC 8.4 4.4 - 11.3 x10*3/uL    nRBC 0.0 0.0 - 0.0 /100 WBCs    RBC 5.74 (H) 4.00 - 5.20 x10*6/uL    Hemoglobin 15.4 12.0 - 16.0 g/dL    Hematocrit 46.6 (H) 36.0 - 46.0 %    MCV 81 80 - 100 fL    MCH 26.8 26.0 - 34.0 pg    MCHC 33.0 32.0 - 36.0 g/dL    RDW 13.3 11.5 - 14.5 %    Platelets 217 150 - 450 x10*3/uL    Neutrophils % 79.4 40.0 - 80.0 %    Immature Granulocytes %, Automated 0.5 0.0 - 0.9 %    Lymphocytes % 16.1 13.0 - 44.0 %    Monocytes % 3.7 2.0 - 10.0 %    Eosinophils % 0.1 0.0 - 6.0 %    Basophils % 0.2 0.0 - 2.0 %    Neutrophils Absolute 6.64 1.20 - 7.70 x10*3/uL    Immature Granulocytes Absolute, Automated 0.04 0.00 - 0.70 x10*3/uL    Lymphocytes Absolute 1.35 1.20 - 4.80 x10*3/uL    Monocytes Absolute 0.31 0.10 - 1.00 x10*3/uL    Eosinophils Absolute 0.01 0.00 - 0.70 x10*3/uL    Basophils Absolute 0.02 0.00 - 0.10 x10*3/uL   Magnesium   Result Value Ref Range    Magnesium 2.11 1.60 - 2.40 mg/dL   Comprehensive metabolic panel   Result Value Ref Range    Glucose 120 (H) 74 - 99 mg/dL    Sodium 140 136 - 145 mmol/L    Potassium 3.7 3.5 - 5.3 mmol/L    Chloride 105 98 - 107 mmol/L    Bicarbonate 24 21 - 32 mmol/L    Anion Gap 15 10 - 20 mmol/L    Urea Nitrogen 15 6 - 23 mg/dL    Creatinine 0.79 0.50 - 1.05 mg/dL    eGFR 81 >60 mL/min/1.73m*2    Calcium 9.7 8.6 - 10.3 mg/dL    Albumin 4.4 3.4 - 5.0 g/dL    Alkaline Phosphatase 75 33 - 136 U/L    Total Protein 7.4 6.4 - 8.2 g/dL    AST 18 9 - 39 U/L     Bilirubin, Total 0.4 0.0 - 1.2 mg/dL    ALT 18 7 - 45 U/L   Protime-INR   Result Value Ref Range    Protime 12.4 9.8 - 12.4 seconds    INR 1.1 0.9 - 1.1   APTT   Result Value Ref Range    aPTT 31 26 - 36 seconds   B-Type Natriuretic Peptide   Result Value Ref Range    BNP 20 0 - 99 pg/mL   D-Dimer, VTE Exclusion   Result Value Ref Range    D-Dimer, Quantitative VTE Exclusion 455 <=500 ng/mL FEU   Troponin I, High Sensitivity, Initial   Result Value Ref Range    Troponin I, High Sensitivity <3 0 - 13 ng/L   Urinalysis with Reflex Culture and Microscopic   Result Value Ref Range    Color, Urine Colorless (N) Light-Yellow, Yellow, Dark-Yellow    Appearance, Urine Clear Clear    Specific Gravity, Urine 1.005 1.005 - 1.035    pH, Urine 6.0 5.0, 5.5, 6.0, 6.5, 7.0, 7.5, 8.0    Protein, Urine NEGATIVE NEGATIVE, 10 (TRACE), 20 (TRACE) mg/dL    Glucose, Urine 70 (1+) (A) Normal mg/dL    Blood, Urine NEGATIVE NEGATIVE mg/dL    Ketones, Urine NEGATIVE NEGATIVE mg/dL    Bilirubin, Urine NEGATIVE NEGATIVE mg/dL    Urobilinogen, Urine Normal Normal mg/dL    Nitrite, Urine NEGATIVE NEGATIVE    Leukocyte Esterase, Urine NEGATIVE NEGATIVE   Troponin, High Sensitivity, 1 Hour   Result Value Ref Range    Troponin I, High Sensitivity <3 0 - 13 ng/L   Sars-CoV-2 and Influenza A/B PCR   Result Value Ref Range    Flu A Result Not Detected Not Detected    Flu B Result Not Detected Not Detected    Coronavirus 2019, PCR Not Detected Not Detected      XR chest 1 view    Result Date: 3/17/2025  Interpreted By:  Jose Luis Terrell, STUDY: XR CHEST 1 VIEW   INDICATION: Signs/Symptoms:palpitations.   COMPARISON: None   ACCESSION NUMBER(S): [id number]   ORDERING CLINICIAN: MATHEW CORBIN   FINDINGS: No consolidation, effusion, edema, or pneumothorax. Heart size within normal limits.       No evidence of acute intrathoracic abnormality.   Signed by: Jose Luis Terrell 3/17/2025 5:35 PM Dictation workstation:   [id number]      PAST HISTORIES AND ALLERGIES      Past Medical History  She has a past medical history of Depression and HLD (hyperlipidemia).    Surgical History  She has no past surgical history on file.     Social History  She reports that she has never smoked. She has never used smokeless tobacco. She reports that she does not use drugs. No history on file for alcohol use.    Family History  Family History   Problem Relation Name Age of Onset    Breast cancer Mother  88    Ovarian cancer Mother's Sister  80       Allergies  Patient has no known allergies.    MEDICATIONS     Scheduled Medications:  enoxaparin, 40 mg, subcutaneous, q24h  [START ON 3/18/2025] PARoxetine, 20 mg, oral, Daily before breakfast  potassium chloride CR, 40 mEq, oral, Once  simvastatin, 20 mg, oral, Nightly      Continuous Medications:     PRN Medications:  PRN medications: bisacodyl, bisacodyl, guaiFENesin, melatonin, ondansetron **OR** ondansetron     REVIEW OF SYSTEMS     Review of Systems   Constitutional:  Positive for diaphoresis and fatigue. Negative for activity change, appetite change, chills and fever.   HENT:  Negative for congestion, ear pain, rhinorrhea, sinus pain and sore throat.    Respiratory:  Negative for apnea, cough, chest tightness, shortness of breath, wheezing and stridor.    Cardiovascular:  Positive for palpitations. Negative for chest pain and leg swelling.   Gastrointestinal:  Negative for abdominal distention, abdominal pain, constipation, diarrhea, nausea and vomiting.   Genitourinary:  Negative for difficulty urinating, dysuria, flank pain, frequency, hematuria and urgency.   Musculoskeletal:  Negative for arthralgias, back pain, gait problem, joint swelling and myalgias.   Skin:  Negative for color change, pallor, rash and wound.   Neurological:  Negative for dizziness, syncope, weakness, light-headedness, numbness and headaches.   Psychiatric/Behavioral:  Negative for agitation, behavioral problems, confusion and decreased concentration. The patient is  not nervous/anxious.    All other systems reviewed and are negative.      OBJECTIVE     Last Recorded Vitals  /77   Pulse 71   Temp 37.2 °C (98.9 °F) (Oral)   Resp 12   Wt 86.2 kg (190 lb)   SpO2 95%      Physical Exam:  Vital signs and nursing notes reviewed.   Constitutional: Pleasant and cooperative. Laying in bed in no acute distress. Conversant.   Skin: Warm and dry; no obvious lesions, rashes, pallor, or jaundice.   Eyes: EOMI. Anicteric sclera.   ENT: Mucous membranes moist; no obvious injury or deformity appreciated.   Head and Neck: Normocephalic, atraumatic. ROM preserved. Trachea midline. No appreciable JVD.   Respiratory: Nonlabored on RA. Lungs clear to auscultation bilaterally without obvious adventitious sounds. Chest rise is equal.  Cardiovascular: RRR. No gross murmur, gallop, or rub. Extremities are warm and well-perfused with good capillary refill (< 3 seconds). No chest wall tenderness.   GI: Abdomen soft, nontender, nondistended. No obvious organomegaly appreciated. Bowel sounds are present.  : No CVA tenderness.   MSK: No gross abnormalities appreciated. No limitations to AROM/PROM appreciated.   Extremities: No cyanosis, edema, or clubbing evident. Neurovascularly intact.   Neuro: A&Ox3. CN 2-12 grossly intact. Able to respond to questions appropriately and clearly. No acute focal neurologic deficits appreciated.  Psych: Appropriate mood and behavior.    ASSESSMENT AND PLAN   Assessment/Plan     69 y.o. female with PMHx s/f HLD, depression presenting with palpitations.    Admit to observation    Palpitations:  Monitor overnight on telemetry  Keep K >4.0 and Mag >2.0  Cardiology consulted  TTE in AM  Repeat EKG in AM. QTC on EKG at tonight is 421 ms.    Depression:  Continue home Paxil at 20 ms. May be contributing to QTC issues.    HLD:  Continue home statin.    Diet: Regular  DVT Prophylaxis: Lovenox   Code Status: Full Code   Case Discussed With: ED provider  Additional Sources  Reviewed: ED note day of admission; past ER notes    Anticipated Length of Stay (LOS): Patient will require overnight (one midnight) stay for further evaluation and management.     DO Rama Phelps dictation software was used to dictate this note and thus there may be minor errors in translation/transcription including garbled speech or misspellings. Please contact for clarification if needed.

## 2025-03-18 NOTE — CARE PLAN
The patient's goals for the shift include      The clinical goals for the shift include Maintain pt's safety and manade pt's pain throughout the shift    Over the shift, the patient did make progress toward the following goals.

## 2025-03-18 NOTE — CONSULTS
Inpatient consult to Cardiology  Consult performed by: Shiraz Patrick MD  Consult ordered by: Constantin Deng DO        History Of Present Illness:    Yomaira Alanis is a 69 y.o. female with PMHx s/f HLD, depression presenting with palpitations. 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 She says she gets a fluttering feeling suddenly in her chest accompanied by face/upper body diaphoresis and a general feeling of malaise. These self terminate after about a minute or two. No syncope, lightheadedness, shortness of breath, overt chest pain, nausea, vomiting, abdominal pain or other symptoms. She had an episode this morning while at work She than had three more episodes at home and one more episode in the ER. In the ER she was on telemetry at the time and no arrhythmias were recorded per ER provider. Pt is on what sounds like an SSRI for depression, which was decreased from 40 mg to 20 mg daily per pt request. No known history of arrhythmias or cardiac issues in the past. Pt does not use tobacco, alcohol, or illicit drugs.        Last Recorded Vitals:  Vitals:    03/17/25 2204 03/18/25 0123 03/18/25 0524 03/18/25 0900   BP: 147/83 115/56 123/61 137/73   BP Location: Left arm Left arm Left arm Left arm   Patient Position: Sitting Lying Lying Sitting   Pulse: 82 61 67 73   Resp: 15 16 16 17   Temp: 36.2 °C (97.1 °F) 36.6 °C (97.9 °F) 36.5 °C (97.7 °F) 36.4 °C (97.5 °F)   TempSrc: Temporal Temporal Temporal Temporal   SpO2: 93% 95% 95% 97%   Weight:       Height:           Last Labs:  CBC - 3/18/2025:  6:53 AM  8.0 13.2 189    40.1      CMP - 3/18/2025:  6:53 AM  8.9 7.4 18 --- 0.4   _ 4.4 18 75      PTT - 3/17/2025:  4:35 PM  1.1   12.4 31     Troponin I, High Sensitivity   Date/Time Value Ref Range Status   03/17/2025 05:39 PM <3 0 - 13 ng/L Final   03/17/2025 04:35 PM <3 0 - 13 ng/L Final     BNP   Date/Time Value Ref Range  "Status   03/17/2025 04:35 PM 20 0 - 99 pg/mL Final      Last I/O:  No intake/output data recorded.    Past Cardiology Tests (Last 3 Years):  EKG:  ECG 12 lead 03/17/2025 (Preliminary)    Echo:  No results found for this or any previous visit from the past 1095 days.    Ejection Fractions:  No results found for: \"EF\"  Cath:  No results found for this or any previous visit from the past 1095 days.    Stress Test:  No results found for this or any previous visit from the past 1095 days.    Cardiac Imaging:  No results found for this or any previous visit from the past 1095 days.      Past Medical History:  She has a past medical history of Depression and HLD (hyperlipidemia).    Past Surgical History:  She has no past surgical history on file.      Social History:  She reports that she has never smoked. She has never used smokeless tobacco. She reports that she does not use drugs. No history on file for alcohol use.    Family History:  Family History   Problem Relation Name Age of Onset    Breast cancer Mother  88    Ovarian cancer Mother's Sister  80        Allergies:  Patient has no known allergies.    Inpatient Medications:  Scheduled medications   Medication Dose Route Frequency    enoxaparin  40 mg subcutaneous q24h    PARoxetine  20 mg oral Daily before breakfast    perflutren protein A microsphere        simvastatin  20 mg oral Nightly     PRN medications   Medication    bisacodyl    bisacodyl    guaiFENesin    melatonin    ondansetron    Or    ondansetron    perflutren protein A microsphere     Continuous Medications   Medication Dose Last Rate     Outpatient Medications:  Current Outpatient Medications   Medication Instructions    lansoprazole (PREVACID) 15 mg, oral, Daily before breakfast, Do not crush or chew.    multivitamin tablet 1 tablet, oral, Daily    omega-3 acid ethyl esters (LOVAZA) 1 g, oral, Daily    PARoxetine (PAXIL) 20 mg, Daily before breakfast    saccharomyces boulardii (FLORASTOR) 250 mg, " oral, Daily    simvastatin (ZOCOR) 20 mg, oral, Nightly    vitamin D3-vitamin K2, MK4, (K2 Plus D3) 1,000-100 unit-mcg tablet 1,000 Units, oral, Daily       Physical Exam:    Vital signs and nursing notes reviewed.   Constitutional: Pleasant and cooperative. Laying in bed in no acute distress. Conversant.   Skin: Warm and dry; no obvious lesions, rashes, pallor, or jaundice.   Eyes: EOMI. Anicteric sclera.   ENT: Mucous membranes moist; no obvious injury or deformity appreciated.   Head and Neck: Normocephalic, atraumatic. ROM preserved. Trachea midline. No appreciable JVD.   Respiratory: Nonlabored on RA. Lungs clear to auscultation bilaterally without obvious adventitious sounds. Chest rise is equal.  Cardiovascular: RRR. No gross murmur, gallop, or rub. Extremities are warm and well-perfused with good capillary refill (< 3 seconds). No chest wall tenderness.   GI: Abdomen soft, nontender, nondistended. No obvious organomegaly appreciated. Bowel sounds are present.  : No CVA tenderness.   MSK: No gross abnormalities appreciated. No limitations to AROM/PROM appreciated.   Extremities: No cyanosis, edema, or clubbing evident. Neurovascularly intact.   Neuro: A&Ox3. CN 2-12 grossly intact. Able to respond to questions appropriately and clearly. No acute focal neurologic deficits appreciated.  Psych: Appropriate mood and behavior.     Assessment/Plan   1) Atypical chest pain/Palpitations  Check stress echo  Outpatient 7 day holter  Peripheral IV 03/17/25 22 G Right Hand (Active)   Site Assessment Clean;Dry;Intact 03/17/25 2200   Dressing Type Transparent 03/17/25 2200   Line Status Flushed 03/17/25 2200   Dressing Status Clean;Dry 03/17/25 2200   Number of days: 1       Code Status:  Full Code    I spent 30 minutes in the professional and overall care of this patient.        Shiraz Patrick MD

## 2025-03-18 NOTE — ED NOTES
"Pt arrives to ED     Code Status:  No Order    HPI     Chief Complaint   Patient presents with    Dizziness     Pt states 5 episodes of dizziness, sweating and feeling \"jittery\" today.  Pt denies any Chest pain or shob        /77   Pulse 71   Temp 37.2 °C (98.9 °F) (Oral)   Resp 12   Wt 86.2 kg (190 lb)   SpO2 95%     Destiny Coma Scale Score: 15      LDA:   Peripheral IV 03/17/25 22 G Right Hand (Active)   Placement Date/Time: 03/17/25 1540   Size (Gauge): 22 G  Orientation: Right  Location: Hand   Number of days: 0        BACKGROUND  History reviewed. No pertinent past medical history.  History reviewed. No pertinent surgical history.  No current facility-administered medications on file prior to encounter.     No current outpatient medications on file prior to encounter.        ASSESSMENT  Diagnoses as of 03/17/25 2034   Palpitations   Diaphoresis   Episode of generalized weakness       Medications Currently Running:       Medications Given:           RESULTS    Imaging:  XR chest 1 view   Final Result   No evidence of acute intrathoracic abnormality.        Signed by: Jose Luis Terrell 3/17/2025 5:35 PM   Dictation workstation:   [id number]         }  Labs ::99  Abnormal Labs Reviewed   CBC WITH AUTO DIFFERENTIAL - Abnormal; Notable for the following components:       Result Value    RBC 5.74 (*)     Hematocrit 46.6 (*)     All other components within normal limits   COMPREHENSIVE METABOLIC PANEL - Abnormal; Notable for the following components:    Glucose 120 (*)     All other components within normal limits   URINALYSIS WITH REFLEX CULTURE AND MICROSCOPIC - Abnormal; Notable for the following components:    Color, Urine Colorless (*)     Glucose, Urine 70 (1+) (*)     All other components within normal limits          '     Elfego Cordon RN  03/17/25 2034    "

## 2025-03-19 ENCOUNTER — APPOINTMENT (OUTPATIENT)
Dept: CARDIOLOGY | Facility: HOSPITAL | Age: 70
End: 2025-03-19
Payer: MEDICARE

## 2025-03-19 ENCOUNTER — ANCILLARY PROCEDURE (OUTPATIENT)
Dept: CARDIOLOGY | Facility: HOSPITAL | Age: 70
End: 2025-03-19
Payer: MEDICARE

## 2025-03-19 DIAGNOSIS — R00.2 PALPITATIONS: ICD-10-CM

## 2025-03-19 DIAGNOSIS — R00.2 PALPITATIONS: Primary | ICD-10-CM

## 2025-03-19 LAB
ATRIAL RATE: 75 BPM
P AXIS: 52 DEGREES
PR INTERVAL: 144 MS
Q ONSET: 255 MS
QRS COUNT: 12 BEATS
QRS DURATION: 79 MS
QT INTERVAL: 523 MS
QTC CALCULATION(BAZETT): 585 MS
QTC FREDERICIA: 563 MS
R AXIS: -20 DEGREES
T AXIS: 174 DEGREES
T OFFSET: 516 MS
VENTRICULAR RATE: 75 BPM

## 2025-03-19 PROCEDURE — 93246 EXT ECG>7D<15D RECORDING: CPT

## 2025-03-20 LAB
ATRIAL RATE: 70 BPM
P AXIS: 40 DEGREES
PR INTERVAL: 143 MS
Q ONSET: 254 MS
QRS COUNT: 11 BEATS
QRS DURATION: 96 MS
QT INTERVAL: 390 MS
QTC CALCULATION(BAZETT): 421 MS
QTC FREDERICIA: 410 MS
R AXIS: -23 DEGREES
T AXIS: 11 DEGREES
T OFFSET: 449 MS
VENTRICULAR RATE: 70 BPM

## 2025-04-14 PROCEDURE — 93248 EXT ECG>7D<15D REV&INTERPJ: CPT | Performed by: INTERNAL MEDICINE

## 2025-04-15 ENCOUNTER — TELEPHONE (OUTPATIENT)
Dept: CARDIOLOGY | Facility: HOSPITAL | Age: 70
End: 2025-04-15
Payer: MEDICARE

## 2025-04-15 NOTE — TELEPHONE ENCOUNTER
RN called pt at this time regarding holter results. Pt needs a follow up appointment. RN notified Dr. Patrick of results at this time. No answer, RN left message for patient to call back.

## 2025-04-22 ENCOUNTER — TELEPHONE (OUTPATIENT)
Dept: CARDIOLOGY | Facility: HOSPITAL | Age: 70
End: 2025-04-22
Payer: MEDICARE

## 2025-04-22 NOTE — TELEPHONE ENCOUNTER
RN called pt at this time regarding needing a follow up after testing, no answer at this time. RN left message for patient to call back.      ----- Message from Shiraz Patrick sent at 4/22/2025  4:04 PM EDT -----  Regarding: RE: gilo report  Follow up needed  ----- Message -----  From: Celina Gasca RN  Sent: 4/22/2025  12:14 PM EDT  To: Shiraz Patrick MD  Subject: zio report                                       9 runs of SVT, no meds, no follow up

## 2025-05-05 NOTE — PROGRESS NOTES
"Counseling:  The patient was counseled regarding diagnostic results, instructions for management, risk factor reductions, prognosis, patient and family education, impressions, risks and benefits of treatment options and importance of compliance with treatment.      Chief Complaint:   The patient presents today for post-hospitalization followup of palpitations s/p outpatient Holter monitor.      History Of Present Illness:    Yomaira Alanis is a 69 y.o. female whose PMH is significant for palpitations, hyperlipidemia and depression. She presents today for post-hospitalization followup of palpitations s/p outpatient Holter monitor. The patient was admitted to hospital from 03/17/2025 to 03/18/2025 after presenting to the ED with a chief complaint of palpitations. While in hospital, echocardiogram demonstrated an EF of 63%, Grade I impaired relaxation pattern of LV diastolic filling with normal left atrial filling pressure, normal RV systolic function, mildly elevated RVSP and moderately increased posterior LV wall thickness, and exercise stress echo revealed a resting EF of 55-60%, peak EF of 60-65% and no stress-induced wall motion abnormalities. She was discharged home with an outpatient Holter monitor. Holter monitoring performed from 03/19/2025 to 04/01/2025 revealed 9 runs of SVT. Today, the patient states that she is feeling improved since being discharged home from hospital, although she reports persistent, frequent palpitations.       Last Recorded Vitals:  Vitals:    05/06/25 1335   BP: 120/80   BP Location: Left arm   Patient Position: Sitting   BP Cuff Size: Adult   Pulse: 65   SpO2: 95%   Weight: 87.5 kg (193 lb)   Height: 1.575 m (5' 2\")       Past Surgical History:  She has no past surgical history on file.      Social History:  She reports that she has never smoked. She has never used smokeless tobacco. She reports that she does not use drugs. No history on file for alcohol use.    Family " History:  Family History[1]     Allergies:  Patient has no known allergies.    Outpatient Medications:  Current Outpatient Medications   Medication Instructions    busPIRone (BUSPAR) 5 mg, 2 times daily    lansoprazole (PREVACID) 15 mg, Daily before breakfast    metroNIDAZOLE (Metrocream) 0.75 % cream APPLY TO THE AFFECTED AREA OF face TWICE DAILY    multivitamin tablet 1 tablet, Daily    omega-3 acid ethyl esters (LOVAZA) 1 g, Daily    PARoxetine (PAXIL) 20 mg, Daily before breakfast    saccharomyces boulardii (FLORASTOR) 250 mg, Daily    simvastatin (ZOCOR) 20 mg, Nightly    vitamin D3-vitamin K2, MK4, (K2 Plus D3) 1,000-100 unit-mcg tablet 1,000 Units, Daily     Review of Systems   Cardiovascular:  Positive for palpitations.   All other systems reviewed and are negative.     Physical Exam:  Constitutional:       Appearance: Healthy appearance. Not in distress.   Neck:      Vascular: No JVR. JVD normal.   Pulmonary:      Effort: Pulmonary effort is normal.      Breath sounds: Normal breath sounds. No wheezing. No rhonchi. No rales.   Chest:      Chest wall: Not tender to palpatation.   Cardiovascular:      PMI at left midclavicular line. Normal rate. Regular rhythm. Normal S1. Normal S2.       Murmurs: There is no murmur.      No gallop.  No click. No rub.   Pulses:     Intact distal pulses.   Edema:     Peripheral edema absent.   Abdominal:      General: Bowel sounds are normal.      Palpations: Abdomen is soft.      Tenderness: There is no abdominal tenderness.   Musculoskeletal: Normal range of motion.         General: No tenderness. Skin:     General: Skin is warm and dry.   Neurological:      General: No focal deficit present.      Mental Status: Alert and oriented to person, place and time.          Last Labs:  CBC -  Lab Results   Component Value Date    WBC 8.0 03/18/2025    HGB 13.2 03/18/2025    HCT 40.1 03/18/2025    MCV 82 03/18/2025     03/18/2025       CMP -  Lab Results   Component Value Date  "   CALCIUM 8.9 03/18/2025    PROT 7.4 03/17/2025    ALBUMIN 4.4 03/17/2025    AST 18 03/17/2025    ALT 18 03/17/2025    ALKPHOS 75 03/17/2025    BILITOT 0.4 03/17/2025       LIPID PANEL -   No results found for: \"CHOL\", \"TRIG\", \"HDL\", \"CHHDL\", \"LDLF\", \"VLDL\", \"NHDL\"    RENAL FUNCTION PANEL -   Lab Results   Component Value Date    GLUCOSE 95 03/18/2025     03/18/2025    K 3.8 03/18/2025     (H) 03/18/2025    CO2 26 03/18/2025    ANIONGAP 10 03/18/2025    BUN 15 03/18/2025    CREATININE 0.81 03/18/2025    CALCIUM 8.9 03/18/2025    ALBUMIN 4.4 03/17/2025        Lab Results   Component Value Date    BNP 20 03/17/2025       Last Cardiology Tests:  03/19/2025 to 04/01/2025 - Holter Monitor  1. Predominant underlying rhythm was sinus rhythm; min HR 53 bpm, max  bpm, avg HR 70 bpm.  2. 9 supraventricular tachycardia runs occurred; fastest interval lasting 18 beats with max rate 190 bpm, longest lasting 19.5 secs with avg rate 156 bpm.  3. Isolated SVEs were rare, SVE couplets were rare, and SVE triplets were rare.  4. Isolated VEs were rare, and no VE couplets or VE triplets were present.     03/18/2025 - TTE  1. The left ventricular systolic function is normal, with a Kohler's biplane calculated ejection fraction of 63%.  2. Spectral Doppler shows a Grade I (impaired relaxation pattern) of left ventricular diastolic filling with normal left atrial filling pressure.  3. There is normal right ventricular global systolic function.  4. Mildly elevated right ventricular systolic pressure.  5. There is moderately increased posterior left ventricular wall thickness.    03/18/2025 - Exercise Stress Echo  1. The resting ejection fraction was estimated at 55 to 60% with a peak exercise ejection fraction estimated at 60 to 65%.  2. Normal global left ventricular systolic function.  3. Adequate level of stress achieved.  4. The Tucker score is 3.  5. There were no stress-induced wall motion abnormalities. This is a " negative stress echo test for ischemia.     Lab review: I have personally reviewed the laboratory result(s).  Diagnostic review: I have personally reviewed the result(s) of the Holter Monitor.    Assessment/Plan   1) Palpitations - SVT by Holter  Hospital admission 03/17/2025 to 03/18/2025 with palpitations  TTE with LVEF 63%, Grade I impaired relaxation pattern of LV diastolic filling with normal left atrial filling pressure, normal RV systolic function, mildly elevated RVSP, moderately increased posterior LV wall thickness.  Exercise stress echo with resting EF of 55-60%, peak EF of 60-65%, no stress-induced wall motion abnormalities  Discharged home with outpatient Holter monitor  Holter monitoring 03/19/2025 to 04/01/2025 with 9 runs of SVT  Reports persistent, frequent palpitations  Unable to start BB s/t low resting HR  Referral placed to Dr. Barakat, EP re: possible SVT ablation  Followup with Aida Nolasco NP, in 3 months        Scribe Attestation  By signing my name below, I, Johanne Arora attest that this documentation has been prepared under the direction and in the presence of Shiraz Patrick MD.        [1]   Family History  Problem Relation Name Age of Onset    Breast cancer Mother  88    Ovarian cancer Mother's Sister  80

## 2025-05-06 ENCOUNTER — OFFICE VISIT (OUTPATIENT)
Dept: CARDIOLOGY | Facility: HOSPITAL | Age: 70
End: 2025-05-06
Payer: MEDICARE

## 2025-05-06 VITALS
HEIGHT: 62 IN | OXYGEN SATURATION: 95 % | WEIGHT: 193 LBS | SYSTOLIC BLOOD PRESSURE: 120 MMHG | DIASTOLIC BLOOD PRESSURE: 80 MMHG | BODY MASS INDEX: 35.51 KG/M2 | HEART RATE: 65 BPM

## 2025-05-06 DIAGNOSIS — I47.10 SVT (SUPRAVENTRICULAR TACHYCARDIA) (CMS-HCC): Primary | ICD-10-CM

## 2025-05-06 DIAGNOSIS — R00.2 PALPITATIONS: ICD-10-CM

## 2025-05-06 LAB
ATRIAL RATE: 65 BPM
P AXIS: 61 DEGREES
P OFFSET: 204 MS
P ONSET: 147 MS
PR INTERVAL: 140 MS
Q ONSET: 217 MS
QRS COUNT: 10 BEATS
QRS DURATION: 78 MS
QT INTERVAL: 532 MS
QTC CALCULATION(BAZETT): 553 MS
QTC FREDERICIA: 546 MS
R AXIS: -11 DEGREES
T AXIS: 14 DEGREES
T OFFSET: 483 MS
VENTRICULAR RATE: 65 BPM

## 2025-05-06 PROCEDURE — 3008F BODY MASS INDEX DOCD: CPT | Performed by: INTERNAL MEDICINE

## 2025-05-06 PROCEDURE — 99213 OFFICE O/P EST LOW 20 MIN: CPT | Performed by: INTERNAL MEDICINE

## 2025-05-06 PROCEDURE — 99213 OFFICE O/P EST LOW 20 MIN: CPT | Mod: 25 | Performed by: INTERNAL MEDICINE

## 2025-05-06 PROCEDURE — 1159F MED LIST DOCD IN RCRD: CPT | Performed by: INTERNAL MEDICINE

## 2025-05-06 PROCEDURE — 93005 ELECTROCARDIOGRAM TRACING: CPT | Performed by: INTERNAL MEDICINE

## 2025-05-06 PROCEDURE — 1036F TOBACCO NON-USER: CPT | Performed by: INTERNAL MEDICINE

## 2025-05-06 RX ORDER — BUSPIRONE HYDROCHLORIDE 5 MG/1
5 TABLET ORAL 2 TIMES DAILY
COMMUNITY
Start: 2025-05-02 | End: 2025-06-01

## 2025-05-06 RX ORDER — METRONIDAZOLE 7.5 MG/G
CREAM TOPICAL
COMMUNITY
Start: 2025-01-13

## 2025-05-06 ASSESSMENT — ENCOUNTER SYMPTOMS: PALPITATIONS: 1

## 2025-05-06 NOTE — LETTER
May 6, 2025     Tiffani Maldonado MD  [address] [facility] [place] [address]    Patient: Yomaira Alanis   YOB: 1955   Date of Visit: 5/6/2025       Dear Dr. Tiffani Maldonado MD:    Thank you for referring Yomaira Alanis to me for evaluation. Below are my notes for this consultation.  If you have questions, please do not hesitate to call me. I look forward to following your patient along with you.       Sincerely,     Shiraz Patrick MD      CC: No Recipients  ______________________________________________________________________________________    Counseling:  The patient was counseled regarding diagnostic results, instructions for management, risk factor reductions, prognosis, patient and family education, impressions, risks and benefits of treatment options and importance of compliance with treatment.      Chief Complaint:   The patient presents today for post-hospitalization followup of palpitations s/p outpatient Holter monitor.      History Of Present Illness:    Yomaira Alanis is a 69 y.o. female whose PMH is significant for palpitations, hyperlipidemia and depression. She presents today for post-hospitalization followup of palpitations s/p outpatient Holter monitor. The patient was admitted to hospital from 03/17/2025 to 03/18/2025 after presenting to the ED with a chief complaint of palpitations. While in hospital, echocardiogram demonstrated an EF of 63%, Grade I impaired relaxation pattern of LV diastolic filling with normal left atrial filling pressure, normal RV systolic function, mildly elevated RVSP and moderately increased posterior LV wall thickness, and exercise stress echo revealed a resting EF of 55-60%, peak EF of 60-65% and no stress-induced wall motion abnormalities. She was discharged home with an outpatient Holter monitor. Holter monitoring performed from 03/19/2025 to 04/01/2025 revealed 9 runs of SVT. Today, the patient states that she is feeling improved since being  "discharged home from hospital, although she reports persistent, frequent palpitations.       Last Recorded Vitals:  Vitals:    05/06/25 1335   BP: 120/80   BP Location: Left arm   Patient Position: Sitting   BP Cuff Size: Adult   Pulse: 65   SpO2: 95%   Weight: 87.5 kg (193 lb)   Height: 1.575 m (5' 2\")       Past Surgical History:  She has no past surgical history on file.      Social History:  She reports that she has never smoked. She has never used smokeless tobacco. She reports that she does not use drugs. No history on file for alcohol use.    Family History:  Family History[1]     Allergies:  Patient has no known allergies.    Outpatient Medications:  Current Outpatient Medications   Medication Instructions   • busPIRone (BUSPAR) 5 mg, 2 times daily   • lansoprazole (PREVACID) 15 mg, Daily before breakfast   • metroNIDAZOLE (Metrocream) 0.75 % cream APPLY TO THE AFFECTED AREA OF face TWICE DAILY   • multivitamin tablet 1 tablet, Daily   • omega-3 acid ethyl esters (LOVAZA) 1 g, Daily   • PARoxetine (PAXIL) 20 mg, Daily before breakfast   • saccharomyces boulardii (FLORASTOR) 250 mg, Daily   • simvastatin (ZOCOR) 20 mg, Nightly   • vitamin D3-vitamin K2, MK4, (K2 Plus D3) 1,000-100 unit-mcg tablet 1,000 Units, Daily     Review of Systems   Cardiovascular:  Positive for palpitations.   All other systems reviewed and are negative.     Physical Exam:  Constitutional:       Appearance: Healthy appearance. Not in distress.   Neck:      Vascular: No JVR. JVD normal.   Pulmonary:      Effort: Pulmonary effort is normal.      Breath sounds: Normal breath sounds. No wheezing. No rhonchi. No rales.   Chest:      Chest wall: Not tender to palpatation.   Cardiovascular:      PMI at left midclavicular line. Normal rate. Regular rhythm. Normal S1. Normal S2.       Murmurs: There is no murmur.      No gallop.  No click. No rub.   Pulses:     Intact distal pulses.   Edema:     Peripheral edema absent.   Abdominal:      " "General: Bowel sounds are normal.      Palpations: Abdomen is soft.      Tenderness: There is no abdominal tenderness.   Musculoskeletal: Normal range of motion.         General: No tenderness. Skin:     General: Skin is warm and dry.   Neurological:      General: No focal deficit present.      Mental Status: Alert and oriented to person, place and time.          Last Labs:  CBC -  Lab Results   Component Value Date    WBC 8.0 03/18/2025    HGB 13.2 03/18/2025    HCT 40.1 03/18/2025    MCV 82 03/18/2025     03/18/2025       CMP -  Lab Results   Component Value Date    CALCIUM 8.9 03/18/2025    PROT 7.4 03/17/2025    ALBUMIN 4.4 03/17/2025    AST 18 03/17/2025    ALT 18 03/17/2025    ALKPHOS 75 03/17/2025    BILITOT 0.4 03/17/2025       LIPID PANEL -   No results found for: \"CHOL\", \"TRIG\", \"HDL\", \"CHHDL\", \"LDLF\", \"VLDL\", \"NHDL\"    RENAL FUNCTION PANEL -   Lab Results   Component Value Date    GLUCOSE 95 03/18/2025     03/18/2025    K 3.8 03/18/2025     (H) 03/18/2025    CO2 26 03/18/2025    ANIONGAP 10 03/18/2025    BUN 15 03/18/2025    CREATININE 0.81 03/18/2025    CALCIUM 8.9 03/18/2025    ALBUMIN 4.4 03/17/2025        Lab Results   Component Value Date    BNP 20 03/17/2025       Last Cardiology Tests:  03/19/2025 to 04/01/2025 - Holter Monitor  1. Predominant underlying rhythm was sinus rhythm; min HR 53 bpm, max  bpm, avg HR 70 bpm.  2. 9 supraventricular tachycardia runs occurred; fastest interval lasting 18 beats with max rate 190 bpm, longest lasting 19.5 secs with avg rate 156 bpm.  3. Isolated SVEs were rare, SVE couplets were rare, and SVE triplets were rare.  4. Isolated VEs were rare, and no VE couplets or VE triplets were present.     03/18/2025 - TTE  1. The left ventricular systolic function is normal, with a Kohler's biplane calculated ejection fraction of 63%.  2. Spectral Doppler shows a Grade I (impaired relaxation pattern) of left ventricular diastolic filling with normal " left atrial filling pressure.  3. There is normal right ventricular global systolic function.  4. Mildly elevated right ventricular systolic pressure.  5. There is moderately increased posterior left ventricular wall thickness.    03/18/2025 - Exercise Stress Echo  1. The resting ejection fraction was estimated at 55 to 60% with a peak exercise ejection fraction estimated at 60 to 65%.  2. Normal global left ventricular systolic function.  3. Adequate level of stress achieved.  4. The Tucker score is 3.  5. There were no stress-induced wall motion abnormalities. This is a negative stress echo test for ischemia.     Lab review: I have personally reviewed the laboratory result(s).  Diagnostic review: I have personally reviewed the result(s) of the Holter Monitor.    Assessment/Plan  1) Palpitations - SVT by Holter  Hospital admission 03/17/2025 to 03/18/2025 with palpitations  TTE with LVEF 63%, Grade I impaired relaxation pattern of LV diastolic filling with normal left atrial filling pressure, normal RV systolic function, mildly elevated RVSP, moderately increased posterior LV wall thickness.  Exercise stress echo with resting EF of 55-60%, peak EF of 60-65%, no stress-induced wall motion abnormalities  Discharged home with outpatient Holter monitor  Holter monitoring 03/19/2025 to 04/01/2025 with 9 runs of SVT  Reports persistent, frequent palpitations  Unable to start BB s/t low resting HR  Referral placed to [facility] re: possible SVT ablation  Followup with Aida Nolasco NP, in 3 months        Scribe Attestation  By signing my name below, I, Johanne Arora attest that this documentation has been prepared under the direction and in the presence of Shiraz Patrick MD.        [1]  Family History  Problem Relation Name Age of Onset   • Breast cancer Mother  88   • Ovarian cancer Mother's Sister  80        [1]  Family History  Problem Relation Name Age of Onset   • Breast cancer Mother  88   • Ovarian  cancer Mother's Sister  80

## 2025-05-06 NOTE — PATIENT INSTRUCTIONS
Dr. Patrick has placed a referral to Dr. Barakat, electrical specialist of the heart, to discuss an ablation procedure for management of your palpitations (SVT - supraventricular tachycardia).   Followup with Aida Nolasco NP, in 3 months.    If you have any questions or cardiac concerns, please call our office at [phone].

## 2025-05-13 ENCOUNTER — HOSPITAL ENCOUNTER (EMERGENCY)
Facility: HOSPITAL | Age: 70
Discharge: HOME | End: 2025-05-14
Attending: EMERGENCY MEDICINE
Payer: MEDICARE

## 2025-05-13 DIAGNOSIS — R31.0 GROSS HEMATURIA: ICD-10-CM

## 2025-05-13 DIAGNOSIS — R30.0 DYSURIA: ICD-10-CM

## 2025-05-13 DIAGNOSIS — R10.2 PELVIC PAIN: ICD-10-CM

## 2025-05-13 DIAGNOSIS — R19.00 PELVIC MASS: Primary | ICD-10-CM

## 2025-05-13 PROCEDURE — 51798 US URINE CAPACITY MEASURE: CPT

## 2025-05-13 PROCEDURE — 99285 EMERGENCY DEPT VISIT HI MDM: CPT | Performed by: EMERGENCY MEDICINE

## 2025-05-13 ASSESSMENT — PAIN DESCRIPTION - DIRECTION: RADIATING_TOWARDS: URETHRA

## 2025-05-13 ASSESSMENT — COLUMBIA-SUICIDE SEVERITY RATING SCALE - C-SSRS
1. IN THE PAST MONTH, HAVE YOU WISHED YOU WERE DEAD OR WISHED YOU COULD GO TO SLEEP AND NOT WAKE UP?: NO
2. HAVE YOU ACTUALLY HAD ANY THOUGHTS OF KILLING YOURSELF?: NO
6. HAVE YOU EVER DONE ANYTHING, STARTED TO DO ANYTHING, OR PREPARED TO DO ANYTHING TO END YOUR LIFE?: NO

## 2025-05-13 ASSESSMENT — PAIN SCALES - GENERAL: PAINLEVEL_OUTOF10: 7

## 2025-05-13 ASSESSMENT — PAIN DESCRIPTION - LOCATION: LOCATION: PELVIS

## 2025-05-13 ASSESSMENT — PAIN - FUNCTIONAL ASSESSMENT: PAIN_FUNCTIONAL_ASSESSMENT: 0-10

## 2025-05-13 ASSESSMENT — PAIN DESCRIPTION - PAIN TYPE: TYPE: ACUTE PAIN

## 2025-05-14 ENCOUNTER — TELEPHONE (OUTPATIENT)
Dept: HEMATOLOGY/ONCOLOGY | Facility: CLINIC | Age: 70
End: 2025-05-14
Payer: MEDICARE

## 2025-05-14 ENCOUNTER — APPOINTMENT (OUTPATIENT)
Dept: RADIOLOGY | Facility: HOSPITAL | Age: 70
End: 2025-05-14
Payer: MEDICARE

## 2025-05-14 VITALS
RESPIRATION RATE: 18 BRPM | BODY MASS INDEX: 35.51 KG/M2 | HEART RATE: 70 BPM | HEIGHT: 62 IN | OXYGEN SATURATION: 98 % | WEIGHT: 193 LBS | DIASTOLIC BLOOD PRESSURE: 84 MMHG | SYSTOLIC BLOOD PRESSURE: 148 MMHG | TEMPERATURE: 98.2 F

## 2025-05-14 DIAGNOSIS — N94.89 ADNEXAL MASS: Primary | ICD-10-CM

## 2025-05-14 LAB
ALBUMIN SERPL BCP-MCNC: 4.4 G/DL (ref 3.4–5)
ALP SERPL-CCNC: 65 U/L (ref 33–136)
ALT SERPL W P-5'-P-CCNC: 14 U/L (ref 7–45)
ANION GAP SERPL CALC-SCNC: 12 MMOL/L (ref 10–20)
APPEARANCE UR: ABNORMAL
AST SERPL W P-5'-P-CCNC: 18 U/L (ref 9–39)
BASOPHILS # BLD AUTO: 0.05 X10*3/UL (ref 0–0.1)
BASOPHILS NFR BLD AUTO: 0.4 %
BILIRUB SERPL-MCNC: 0.5 MG/DL (ref 0–1.2)
BILIRUB UR STRIP.AUTO-MCNC: NEGATIVE MG/DL
BUN SERPL-MCNC: 19 MG/DL (ref 6–23)
CALCIUM SERPL-MCNC: 9.4 MG/DL (ref 8.6–10.3)
CHLORIDE SERPL-SCNC: 106 MMOL/L (ref 98–107)
CO2 SERPL-SCNC: 24 MMOL/L (ref 21–32)
COLOR UR: ABNORMAL
CREAT SERPL-MCNC: 0.76 MG/DL (ref 0.5–1.05)
EGFRCR SERPLBLD CKD-EPI 2021: 85 ML/MIN/1.73M*2
EOSINOPHIL # BLD AUTO: 0.08 X10*3/UL (ref 0–0.7)
EOSINOPHIL NFR BLD AUTO: 0.7 %
ERYTHROCYTE [DISTWIDTH] IN BLOOD BY AUTOMATED COUNT: 13.5 % (ref 11.5–14.5)
GLUCOSE SERPL-MCNC: 89 MG/DL (ref 74–99)
GLUCOSE UR STRIP.AUTO-MCNC: NORMAL MG/DL
HCT VFR BLD AUTO: 46 % (ref 36–46)
HGB BLD-MCNC: 15.1 G/DL (ref 12–16)
HOLD SPECIMEN: 293
IMM GRANULOCYTES # BLD AUTO: 0.04 X10*3/UL (ref 0–0.7)
IMM GRANULOCYTES NFR BLD AUTO: 0.3 % (ref 0–0.9)
KETONES UR STRIP.AUTO-MCNC: NEGATIVE MG/DL
LACTATE SERPL-SCNC: 1 MMOL/L (ref 0.4–2)
LEUKOCYTE ESTERASE UR QL STRIP.AUTO: ABNORMAL
LYMPHOCYTES # BLD AUTO: 2.53 X10*3/UL (ref 1.2–4.8)
LYMPHOCYTES NFR BLD AUTO: 21.8 %
MCH RBC QN AUTO: 27 PG (ref 26–34)
MCHC RBC AUTO-ENTMCNC: 32.8 G/DL (ref 32–36)
MCV RBC AUTO: 82 FL (ref 80–100)
MONOCYTES # BLD AUTO: 0.72 X10*3/UL (ref 0.1–1)
MONOCYTES NFR BLD AUTO: 6.2 %
NEUTROPHILS # BLD AUTO: 8.16 X10*3/UL (ref 1.2–7.7)
NEUTROPHILS NFR BLD AUTO: 70.6 %
NITRITE UR QL STRIP.AUTO: NEGATIVE
NRBC BLD-RTO: 0 /100 WBCS (ref 0–0)
PH UR STRIP.AUTO: 8 [PH]
PLATELET # BLD AUTO: 199 X10*3/UL (ref 150–450)
POTASSIUM SERPL-SCNC: 3.6 MMOL/L (ref 3.5–5.3)
PROT SERPL-MCNC: 7.1 G/DL (ref 6.4–8.2)
PROT UR STRIP.AUTO-MCNC: ABNORMAL MG/DL
RBC # BLD AUTO: 5.6 X10*6/UL (ref 4–5.2)
RBC # UR STRIP.AUTO: ABNORMAL MG/DL
RBC #/AREA URNS AUTO: >20 /HPF
SODIUM SERPL-SCNC: 138 MMOL/L (ref 136–145)
SP GR UR STRIP.AUTO: 1.01
SQUAMOUS #/AREA URNS AUTO: ABNORMAL /HPF
UROBILINOGEN UR STRIP.AUTO-MCNC: NORMAL MG/DL
WBC # BLD AUTO: 11.6 X10*3/UL (ref 4.4–11.3)
WBC #/AREA URNS AUTO: >50 /HPF

## 2025-05-14 PROCEDURE — 2550000001 HC RX 255 CONTRASTS: Mod: JZ | Performed by: EMERGENCY MEDICINE

## 2025-05-14 PROCEDURE — 83605 ASSAY OF LACTIC ACID: CPT | Performed by: NURSE PRACTITIONER

## 2025-05-14 PROCEDURE — 85025 COMPLETE CBC W/AUTO DIFF WBC: CPT | Performed by: NURSE PRACTITIONER

## 2025-05-14 PROCEDURE — 87086 URINE CULTURE/COLONY COUNT: CPT | Mod: PORLAB | Performed by: NURSE PRACTITIONER

## 2025-05-14 PROCEDURE — 96360 HYDRATION IV INFUSION INIT: CPT

## 2025-05-14 PROCEDURE — 74177 CT ABD & PELVIS W/CONTRAST: CPT

## 2025-05-14 PROCEDURE — 2500000004 HC RX 250 GENERAL PHARMACY W/ HCPCS (ALT 636 FOR OP/ED): Mod: JZ | Performed by: NURSE PRACTITIONER

## 2025-05-14 PROCEDURE — 80053 COMPREHEN METABOLIC PANEL: CPT | Performed by: NURSE PRACTITIONER

## 2025-05-14 PROCEDURE — 36415 COLL VENOUS BLD VENIPUNCTURE: CPT | Performed by: NURSE PRACTITIONER

## 2025-05-14 PROCEDURE — 74177 CT ABD & PELVIS W/CONTRAST: CPT | Performed by: RADIOLOGY

## 2025-05-14 PROCEDURE — 96361 HYDRATE IV INFUSION ADD-ON: CPT

## 2025-05-14 PROCEDURE — 81001 URINALYSIS AUTO W/SCOPE: CPT | Performed by: NURSE PRACTITIONER

## 2025-05-14 RX ORDER — ONDANSETRON HYDROCHLORIDE 2 MG/ML
4 INJECTION, SOLUTION INTRAVENOUS ONCE AS NEEDED
Status: DISCONTINUED | OUTPATIENT
Start: 2025-05-14 | End: 2025-05-14 | Stop reason: HOSPADM

## 2025-05-14 RX ORDER — KETOROLAC TROMETHAMINE 30 MG/ML
15 INJECTION, SOLUTION INTRAMUSCULAR; INTRAVENOUS ONCE AS NEEDED
Status: DISCONTINUED | OUTPATIENT
Start: 2025-05-14 | End: 2025-05-14 | Stop reason: HOSPADM

## 2025-05-14 RX ADMIN — IOHEXOL 75 ML: 350 INJECTION, SOLUTION INTRAVENOUS at 02:26

## 2025-05-14 RX ADMIN — SODIUM CHLORIDE 500 ML: 0.9 INJECTION, SOLUTION INTRAVENOUS at 00:44

## 2025-05-14 ASSESSMENT — VISUAL ACUITY: OU: 1

## 2025-05-14 NOTE — ED PROVIDER NOTES
"    HPI   Chief Complaint   Patient presents with    Urinary Retention    Pelvic Pain    Vaginal Pain       Patient presents the emergency department for eval of sudden onset pelvic pain and dysuria.  Patient states that after dinner and eating asparagus, she had sudden onset of urinary frequency and urgency.  It is associated with pelvic pain and immense pressure and burning.  Her symptoms have persisted for the last 4 hours.  She has not taken any over-the-counter intervention for symptoms.  She has not noticed any fever, chills, myalgias, malaise, syncope, chest pain, shortness of breath, nausea, vomiting, back pain, hematuria, change in vaginal secretions, postmenopausal bleeding, leg swelling, numbness or rash.  There is no associated traumatic incident.  She has no prior history of ureteral calculus, kidney disease, diverticulitis, Crohn's, personal hx of cancer or surgical intervention of the abdomen.  Her symptoms have improved slightly however she still feels urinary retention causing her to seek evaluation.      History provided by:  Patient and spouse   used: No                          Destiny Coma Scale Score: 15                  Patient History   Medical History[1]  Surgical History[2]  Family History[3]  Social History[4]    Physical Exam   Visit Vitals  /86   Pulse 77   Temp 36.8 °C (98.2 °F) (Temporal)   Resp 16   Ht 1.575 m (5' 2\")   Wt 87.5 kg (193 lb)   SpO2 99%   BMI 35.30 kg/m²   OB Status Postmenopausal   Smoking Status Never   BSA 1.96 m²      Physical Exam  Vitals reviewed.   Constitutional:       General: She is not in acute distress.     Appearance: She is not toxic-appearing.   HENT:      Head: Normocephalic and atraumatic.      Right Ear: Hearing and external ear normal.      Left Ear: Hearing and external ear normal.      Nose: Nose normal.      Mouth/Throat:      Lips: Pink.      Mouth: Mucous membranes are moist.   Eyes:      General: Vision grossly intact. " No scleral icterus.  Cardiovascular:      Rate and Rhythm: Normal rate and regular rhythm.      Pulses:           Radial pulses are 2+ on the left side.      Heart sounds: No murmur heard.     Comments: No resting tachycardia.  Pulmonary:      Effort: Pulmonary effort is normal.      Breath sounds: Normal breath sounds.   Abdominal:      General: Bowel sounds are normal.      Palpations: Abdomen is soft.      Tenderness: There is abdominal tenderness in the suprapubic area. There is guarding. There is no right CVA tenderness or left CVA tenderness.   Genitourinary:     Comments: Pelvic exam deferred for imaging  Musculoskeletal:      Cervical back: Full passive range of motion without pain and neck supple.      Comments: ALVAREZ randomly. No midline vertebral tenderness.    Skin:     General: Skin is warm and dry.      Capillary Refill: Capillary refill takes less than 2 seconds.      Coloration: Skin is not cyanotic, jaundiced or pale.   Neurological:      Mental Status: She is alert and oriented to person, place, and time.      Sensory: Sensation is intact.      Motor: Motor function is intact.      Coordination: Coordination is intact.      Gait: Gait is intact.      Comments: Clear speech.  No facial asymmetry.  Hand grasps, pushes, pulls, dorsiflexion and plantarflexion strong and equal bilaterally.   Psychiatric:         Mood and Affect: Mood and affect normal.         Behavior: Behavior is cooperative.         Thought Content: Thought content normal.         CT abdomen pelvis w IV contrast   Final Result   There is mild bladder wall thickening, mucosal hyperenhancement and   perivesical inflammatory stranding concerning for infectious   cystitis. Correlate with urinalysis.        There is a 6.1 x 7.0 x 5.9 cm hypodense lesion in the left adnexa   likely relate to left ovary. A postmenopausal female differential   considerations include cyst versus cystic ovarian neoplasm. Given   size gynecological consultation  and further evaluation with   nonemergent ultrasound and/or MRI is recommended.             MACRO:   None        Signed by: Chava Lerma 5/14/2025 2:48 AM   Dictation workstation:   [id number]          Labs Reviewed   CBC WITH AUTO DIFFERENTIAL - Abnormal       Result Value    WBC 11.6 (*)     nRBC 0.0      RBC 5.60 (*)     Hemoglobin 15.1      Hematocrit 46.0      MCV 82      MCH 27.0      MCHC 32.8      RDW 13.5      Platelets 199      Neutrophils % 70.6      Immature Granulocytes %, Automated 0.3      Lymphocytes % 21.8      Monocytes % 6.2      Eosinophils % 0.7      Basophils % 0.4      Neutrophils Absolute 8.16 (*)     Immature Granulocytes Absolute, Automated 0.04      Lymphocytes Absolute 2.53      Monocytes Absolute 0.72      Eosinophils Absolute 0.08      Basophils Absolute 0.05     URINALYSIS WITH REFLEX CULTURE AND MICROSCOPIC - Abnormal    Color, Urine Dark-Brown (*)     Appearance, Urine Ex.Turbid (*)     Specific Gravity, Urine 1.010      pH, Urine 8.0      Protein, Urine 100 (2+) (*)     Glucose, Urine Normal      Blood, Urine 1.0 (3+) (*)     Ketones, Urine NEGATIVE      Bilirubin, Urine NEGATIVE      Urobilinogen, Urine Normal      Nitrite, Urine NEGATIVE      Leukocyte Esterase, Urine 500 Marlene/uL (*)    MICROSCOPIC ONLY, URINE - Abnormal    WBC, Urine >50 (*)     RBC, Urine >20 (*)     Squamous Epithelial Cells, Urine 1-9 (SPARSE)     COMPREHENSIVE METABOLIC PANEL - Normal    Glucose 89      Sodium 138      Potassium 3.6      Chloride 106      Bicarbonate 24      Anion Gap 12      Urea Nitrogen 19      Creatinine 0.76      eGFR 85      Calcium 9.4      Albumin 4.4      Alkaline Phosphatase 65      Total Protein 7.1      AST 18      Bilirubin, Total 0.5      ALT 14     LACTATE - Normal    Lactate 1.0      Narrative:     Venipuncture immediately after or during the administration of Metamizole may lead to falsely low results. Testing should be performed immediately prior to Metamizole dosing.    URINE CULTURE   URINALYSIS WITH REFLEX CULTURE AND MICROSCOPIC    Narrative:     The following orders were created for panel order Urinalysis with Reflex Culture and Microscopic.  Procedure                               Abnormality         Status                     ---------                               -----------         ------                     Urinalysis with Reflex C...[[id number]]  Abnormal            Final result               Extra Urine Gray Tube[[id number]]                            In process                   Please view results for these tests on the individual orders.   EXTRA URINE GRAY TUBE       Encounter Date: 05/06/25   ECG 12 Lead   Result Value    Ventricular Rate 65    Atrial Rate 65    ND Interval 140    QRS Duration 78    QT Interval 532    QTC Calculation(Bazett) 553    P Axis 61    R Axis -11    T Axis 14    QRS Count 10    Q Onset 217    P Onset 147    P Offset 204    T Offset 483    QTC Fredericia 546    Narrative    Normal sinus rhythm  Low voltage QRS  Cannot rule out Anterior infarct (cited on or before 19-MAR-2025)  Abnormal ECG  When compared with ECG of 19-MAR-2025 00:00,  QT has lengthened       ED Course & MDM     Medical Decision Making  Patient presents the emergency department for evaluation of urinary retention and pelvic pain.  Differential diagnosis of acute cystitis, ureteral lithiasis, urethral stricture to mention a few.  Plan is for baseline labs, urinalysis with postvoid residual bladder scan, CT imaging of the abdomen pelvis with IV contrast.  Will provide IV fluids, Toradol and Zofran as needed.  Patient provides consent.  Patient to be evaluated by  for review of results, reevaluation of the patient and disposition.        ED Course as of 05/14/25 0419   Wed May 14, 2025   0051 Patient urinated for specimen and was gross hematuria with small clots. Post void residual of 0 mL. [NA]      ED Course User Index  [NA] Lilo Ballard, APRN-CNP         Diagnoses  as of 05/14/25 0419   Pelvic pain   Dysuria   Gross hematuria   Pelvic mass          Your medication list        ASK your doctor about these medications        Instructions Last Dose Given Next Dose Due   busPIRone 5 mg tablet  Commonly known as: Buspar           K2 Plus D3 1,000-100 unit-mcg tablet  Generic drug: vitamin D3-vitamin K2 (MK4)           lansoprazole 15 mg DR capsule  Commonly known as: Prevacid           metroNIDAZOLE 0.75 % cream  Commonly known as: Metrocream           multivitamin tablet           omega-3 acid ethyl esters 1 gram capsule  Commonly known as: Lovaza           PARoxetine 20 mg tablet  Commonly known as: Paxil           saccharomyces boulardii 250 mg capsule  Commonly known as: Florastor           simvastatin 20 mg tablet  Commonly known as: Zocor                    Procedure  None     *This report was transcribed using voice recognition software.  Every effort was made to ensure accuracy; however, inadvertent computerized transcription errors may be present.*  Lilo Ballard, PRAVIN-CNP  05/14/25           PRAVIN Dimas-CNP  05/14/25 0034       PRAVIN Dimas-CNP  05/14/25 0052         [1]   Past Medical History:  Diagnosis Date    Depression     HLD (hyperlipidemia)    [2] No past surgical history on file.  [3]   Family History  Problem Relation Name Age of Onset    Breast cancer Mother  88    Ovarian cancer Mother's Sister  80   [4]   Social History  Tobacco Use    Smoking status: Never    Smokeless tobacco: Never   Vaping Use    Vaping status: Never Used   Substance Use Topics    Alcohol use: Not on file    Drug use: Never        Sergio Hill MD  05/14/25 0424

## 2025-05-14 NOTE — ED TRIAGE NOTES
Pt presents to the ED with pain 'in her bladder down to her urethra'. Pt states this started around 8pm, pt had repeated urination until dinner time, has been unable to go since and now has immense pressure and burning pain.

## 2025-05-14 NOTE — TELEPHONE ENCOUNTER
Spoke with patient. She is aware of incidental finding and referral to Diagnostic Clinic. She has an appointment booked with her regular GYN on 6/10/25. I do not see it in our system, she thought provider was within  but is unsure if practice has changed in past few years. She voiced wanting to see GYN-ONC for hopefully a sooner appointment and to see more specialized provider. She is aware that referral was placed and scheduling would be calling her to book an appointment. Diagnostic Clinic phone number provided, no further needs at this time.

## 2025-05-14 NOTE — ED NOTES
Pt OK for d/c home per provider. All results and findings discussed with patient by provider. Home care and follow up instructions provided to patient. All questions answered. Pt verbalized understanding of all information. Pt A&Ox4, resp easy, skin warm dry and of appropriate color. Departs ED with steady gait.      Lizz Diggs RN  05/14/25 1607

## 2025-05-14 NOTE — TELEPHONE ENCOUNTER
Referral placed to [facility] by , [place] [facility], for adnexal mass, noted on CT A/P imaging today. Referral to gyn onc placed & message sent to [facility] schedulers to assist w/ appt.  PRAVIN Tolliver.CNP  [facility]

## 2025-05-16 LAB — BACTERIA UR CULT: ABNORMAL

## 2025-05-17 ENCOUNTER — TELEPHONE (OUTPATIENT)
Dept: PHARMACY | Facility: HOSPITAL | Age: 70
End: 2025-05-17
Payer: MEDICARE

## 2025-05-17 DIAGNOSIS — N30.00 ACUTE CYSTITIS WITHOUT HEMATURIA: Primary | ICD-10-CM

## 2025-05-17 RX ORDER — NITROFURANTOIN 25; 75 MG/1; MG/1
100 CAPSULE ORAL 2 TIMES DAILY
Qty: 10 CAPSULE | Refills: 0 | Status: SHIPPED | OUTPATIENT
Start: 2025-05-17 | End: 2025-05-22

## 2025-05-17 NOTE — PROGRESS NOTES
EDPD Note: Initiation     Contacted Yomaira Alanis regarding a positive urine culture/result that was taken during their recent emergency room visit. I completed education with patient. The patient is not being treated appropriately.    The following prescription was sent to the patient's preferred pharmacy. No further follow up needed from EDPD Team.     Drug: Macrobid 100 mg  Sig: Take 1 capsule po bid x 5 days  Qty: 10  Days Supply: 5    Presented to ED on 5/13 with urinary retention, pelvic pain, vaginal pain and dysuria. No abx at discharge. Patient was counseled to follow up with gynecology and given referral to [facility] due to CT finding of adnexal cyst.    Today patient endorsed continued dysuria and mild frequency. Recommend a course of Macrobid bid x 5 days and to follow up with pcp if new or worsening symptoms present. Patient agreed to plan and had no further questions.     Susceptibility data from last 90 days.  Collected Specimen Info Organism Ampicillin Cefazolin Cefazolin (uncomplicated UTIs only) Ciprofloxacin Gentamicin Levofloxacin Nitrofurantoin Piperacillin/Tazobactam Trimethoprim/Sulfamethoxazole   05/14/25 Urine from Clean Catch/Voided Escherichia coli  S  S  S  S  S  S  S  S  S       If there are any other questions for the ED Post-Discharge Culture Follow Up Team, please contact [phone]. Fax: [phone].    Bernadine Spicer PharmD

## 2025-06-02 NOTE — PROGRESS NOTES
"Patient ID: Yomaira Alanis is a 69 y.o. female.  Referring Physician: Lynn Cronin, APRN-CNP  [id number] [place] [address]  Primary Care Provider: Tiffani Maldonado MD      Subjective    HPI  69 y.o. presenting with a simple appearing pelvic mass    She was experiencing dysuria and urinary frequency, and she was admitted for a UTI and experienced vaginal bleeding. Notes worsening UU and urinary frequency which concerns her she may have not fully recovered from her UTI. Otherwise she is going to the bathroom normally and eating and drinking normally. Denies experiencing abdominal pain. Daily regimen includes Paxil, Prevacid, Buspar, Florastor and vitamins.     They deny fever, chills, constipation, diarrhea, vaginal bleeding, abdominal pain, nausea, vomiting, or any other symptoms other than those listed in the interval history.    PMH:  Medical History[1]     PSH:  Surgical History[2]   Carpal Tunnel release    OBHx:  The patient is a G0. She underwent menopause in her early 40s. Denies PMB. She used COCs for >10 years. She did not use HRT.    Social:  They deny alcohol, tobacco, and recreational drug use. The patient lives at home with her . The patient works is a retired ..     FamHx:  Mother lived to be 107 with Hx of breast cancer in her 90s. Maternal aunt Hx of uterine cancer. Maternal aunt Hx of breast cancer.  Their history is otherwise negative for a history of breast, ovarian, uterine, colon, pancreatic, and GI cancer.     Screening:  Cervical cancer: NILM age 65  Mammogram:  12/2024  Colonoscopy: 5-10 years ago      Review of Systems - Oncology     Objective   BSA: 1.94 meters squared  /81 (BP Location: Left arm, Patient Position: Sitting, BP Cuff Size: Adult)   Pulse 78   Temp 36.6 °C (97.9 °F) (Temporal)   Resp 16   Ht (S) 1.579 m (5' 2.17\")   Wt 85.7 kg (189 lb 0.7 oz)   SpO2 97%   BMI 34.39 kg/m²      Family History[3]    Yomaira Alanis  " reports that she has never smoked. She has never used smokeless tobacco.  She  has no history on file for alcohol use.  She  reports no history of drug use.    Physical Exam  Constitutional:       General: She is not in acute distress.     Appearance: Normal appearance. She is not toxic-appearing.   HENT:      Head: Normocephalic.      Mouth/Throat:      Mouth: Mucous membranes are moist.      Pharynx: Oropharynx is clear.   Eyes:      Extraocular Movements: Extraocular movements intact.      Conjunctiva/sclera: Conjunctivae normal.      Pupils: Pupils are equal, round, and reactive to light.   Cardiovascular:      Rate and Rhythm: Normal rate and regular rhythm.      Heart sounds: Normal heart sounds. No murmur heard.     No friction rub. No gallop.   Pulmonary:      Effort: Pulmonary effort is normal.      Breath sounds: Normal breath sounds. No wheezing or rhonchi.   Abdominal:      General: Bowel sounds are normal. There is no distension.      Palpations: Abdomen is soft.      Tenderness: There is no abdominal tenderness.   Musculoskeletal:         General: Normal range of motion.      Cervical back: Normal range of motion.   Skin:     General: Skin is warm.   Neurological:      General: No focal deficit present.      Mental Status: She is alert and oriented to person, place, and time.   Psychiatric:         Mood and Affect: Mood normal.         Behavior: Behavior normal.       CT abdomen pelvis w IV contrast  Narrative: Interpreted By:  Chava Lerma,   STUDY:  CT ABDOMEN PELVIS W IV CONTRAST;  5/14/2025 2:35 am      INDICATION:  Signs/Symptoms:lower abdominal pain, urinary retention and dysuria.      COMPARISON:  None.      ACCESSION NUMBER(S):  [id number]      ORDERING CLINICIAN:  TESSA SANDOVAL      TECHNIQUE:  Axial CT images of the abdomen and pelvis with coronal and sagittal  reconstructed images obtained after intravenous administration of 75  mL of Omnipaque 350      FINDINGS:  LOWER CHEST: No acute  abnormality of the lung bases.      ABDOMEN:      LIVER: Within normal limits.  BILE DUCTS: Normal caliber.  GALLBLADDER: No calcified gallstones. No wall thickening.  PANCREAS: Within normal limits.  SPLEEN: Within normal limits.  ADRENALS: Mild nodular thickening of the adrenal glands may relate to  hyperplasia or adenomatous change KIDNEYS and URETERS: Symmetric  renal enhancement. No hydronephrosis or perinephric fluid collection.      VESSELS:  Calcific atherosclerosis of the aortoiliac vessels. No  aortic aneurysm. RETROPERITONEUM: No pathologically enlarged  retroperitoneal lymph nodes.      PELVIS:      REPRODUCTIVE ORGANS: Uterus is present. In the left adnexa there is a  6.1 x 7.0 x 5.9 cm hypodense lesion likely arising from the left  ovary. 7.1 cm (Series 2, Image 91) BLADDER: Bladder is underdistended  with wall thickening, mucosal hyperenhancement and subtle perivesical  stranding concerning for infectious cystitis.      BOWEL: No dilated bowel. Moderate stool burden. Normal appendix.  PERITONEUM: No ascites or free air, no fluid collection.      ABDOMINAL WALL: Within normal limits.  BONES: Multilevel degenerative changes of the spine. Grade 1  anterolisthesis of L4 on 5.      Impression: There is mild bladder wall thickening, mucosal hyperenhancement and  perivesical inflammatory stranding concerning for infectious  cystitis. Correlate with urinalysis.      There is a 6.1 x 7.0 x 5.9 cm hypodense lesion in the left adnexa  likely relate to left ovary. A postmenopausal female differential  considerations include cyst versus cystic ovarian neoplasm. Given  size gynecological consultation and further evaluation with  nonemergent ultrasound and/or MRI is recommended.          MACRO:  None      Signed by: Chava Lerma 5/14/2025 2:48 AM  Dictation workstation:   [id number]        Performance Status:  Asymptomatic    Assessment/Plan      Oncology History    No history exists.     69 y.o. presenting with  a simple appearing pelvic mass, here for management    # Pelvic mass  - We discussed the possible etiologies of a pelvic mass including benign, borderline, and malignant.   - Imaging was reviewed and discussed with the patient  - Tumor markers will be obtained (Ca 125, CA 19-9, CEA)  - Discussed plan for laparoscopic removal of pelvic mass, BSO, possible staging procedure  - Risks of surgery, expected hospital stay, and anticipated recovery were discussed  - She will need PAT  - She is a candidate for same day DC    # UTI sxs  - UA/ Urine culture today       Scribe Attestation  By signing my name below, I, Ashley Oliveira, Scribe   attest that this documentation has been prepared under the direction and in the presence of Roxie Lai MD, MS.       Provider Attestation - Scribe documentation    All medical record entries made by the Scribe were at my direction and personally dictated by me. I have reviewed the chart and agree that the record accurately reflects my personal performance of the history, physical exam, discussion and plan.    Roxie Lai MD, MS               [1]   Past Medical History:  Diagnosis Date    Depression     HLD (hyperlipidemia)    [2] No past surgical history on file.  [3]   Family History  Problem Relation Name Age of Onset    Breast cancer Mother  88    Ovarian cancer Mother's Sister  80

## 2025-06-03 ENCOUNTER — OFFICE VISIT (OUTPATIENT)
Dept: GYNECOLOGIC ONCOLOGY | Facility: CLINIC | Age: 70
End: 2025-06-03
Payer: MEDICARE

## 2025-06-03 ENCOUNTER — PREP FOR PROCEDURE (OUTPATIENT)
Dept: OPERATING ROOM | Facility: HOSPITAL | Age: 70
End: 2025-06-03

## 2025-06-03 VITALS
SYSTOLIC BLOOD PRESSURE: 122 MMHG | TEMPERATURE: 97.9 F | WEIGHT: 189.04 LBS | BODY MASS INDEX: 34.79 KG/M2 | RESPIRATION RATE: 16 BRPM | HEART RATE: 78 BPM | DIASTOLIC BLOOD PRESSURE: 81 MMHG | OXYGEN SATURATION: 97 % | HEIGHT: 62 IN

## 2025-06-03 DIAGNOSIS — R39.9 UTI SYMPTOMS: ICD-10-CM

## 2025-06-03 DIAGNOSIS — N94.89 ADNEXAL MASS: ICD-10-CM

## 2025-06-03 DIAGNOSIS — R97.8 OTHER ABNORMAL TUMOR MARKERS: ICD-10-CM

## 2025-06-03 DIAGNOSIS — R19.00 PELVIC MASS IN FEMALE: Primary | ICD-10-CM

## 2025-06-03 LAB
APPEARANCE UR: CLEAR
BILIRUB UR STRIP.AUTO-MCNC: NEGATIVE MG/DL
COLOR UR: NORMAL
GLUCOSE UR STRIP.AUTO-MCNC: NORMAL MG/DL
KETONES UR STRIP.AUTO-MCNC: NEGATIVE MG/DL
LEUKOCYTE ESTERASE UR QL STRIP.AUTO: NEGATIVE
NITRITE UR QL STRIP.AUTO: NEGATIVE
PH UR STRIP.AUTO: 5.5 [PH]
PROT UR STRIP.AUTO-MCNC: NEGATIVE MG/DL
RBC # UR STRIP.AUTO: NEGATIVE MG/DL
SP GR UR STRIP.AUTO: 1.01
UROBILINOGEN UR STRIP.AUTO-MCNC: NORMAL MG/DL

## 2025-06-03 PROCEDURE — 1160F RVW MEDS BY RX/DR IN RCRD: CPT | Performed by: STUDENT IN AN ORGANIZED HEALTH CARE EDUCATION/TRAINING PROGRAM

## 2025-06-03 PROCEDURE — 1159F MED LIST DOCD IN RCRD: CPT | Performed by: STUDENT IN AN ORGANIZED HEALTH CARE EDUCATION/TRAINING PROGRAM

## 2025-06-03 PROCEDURE — 3008F BODY MASS INDEX DOCD: CPT | Performed by: STUDENT IN AN ORGANIZED HEALTH CARE EDUCATION/TRAINING PROGRAM

## 2025-06-03 PROCEDURE — 1126F AMNT PAIN NOTED NONE PRSNT: CPT | Performed by: STUDENT IN AN ORGANIZED HEALTH CARE EDUCATION/TRAINING PROGRAM

## 2025-06-03 PROCEDURE — 99215 OFFICE O/P EST HI 40 MIN: CPT | Mod: 25 | Performed by: STUDENT IN AN ORGANIZED HEALTH CARE EDUCATION/TRAINING PROGRAM

## 2025-06-03 PROCEDURE — 87086 URINE CULTURE/COLONY COUNT: CPT | Mod: GEALAB | Performed by: STUDENT IN AN ORGANIZED HEALTH CARE EDUCATION/TRAINING PROGRAM

## 2025-06-03 PROCEDURE — 81003 URINALYSIS AUTO W/O SCOPE: CPT | Performed by: STUDENT IN AN ORGANIZED HEALTH CARE EDUCATION/TRAINING PROGRAM

## 2025-06-03 PROCEDURE — 36415 COLL VENOUS BLD VENIPUNCTURE: CPT | Performed by: STUDENT IN AN ORGANIZED HEALTH CARE EDUCATION/TRAINING PROGRAM

## 2025-06-03 PROCEDURE — 82378 CARCINOEMBRYONIC ANTIGEN: CPT | Mod: GEALAB | Performed by: STUDENT IN AN ORGANIZED HEALTH CARE EDUCATION/TRAINING PROGRAM

## 2025-06-03 PROCEDURE — 86301 IMMUNOASSAY TUMOR CA 19-9: CPT | Mod: GEALAB | Performed by: STUDENT IN AN ORGANIZED HEALTH CARE EDUCATION/TRAINING PROGRAM

## 2025-06-03 PROCEDURE — 99205 OFFICE O/P NEW HI 60 MIN: CPT | Performed by: STUDENT IN AN ORGANIZED HEALTH CARE EDUCATION/TRAINING PROGRAM

## 2025-06-03 PROCEDURE — 86304 IMMUNOASSAY TUMOR CA 125: CPT | Mod: GEALAB | Performed by: STUDENT IN AN ORGANIZED HEALTH CARE EDUCATION/TRAINING PROGRAM

## 2025-06-03 RX ORDER — GABAPENTIN 300 MG/1
300 CAPSULE ORAL ONCE
OUTPATIENT
Start: 2025-06-03 | End: 2025-06-03

## 2025-06-03 RX ORDER — HEPARIN SODIUM 5000 [USP'U]/ML
5000 INJECTION, SOLUTION INTRAVENOUS; SUBCUTANEOUS ONCE
OUTPATIENT
Start: 2025-06-03 | End: 2025-06-03

## 2025-06-03 RX ORDER — ACETAMINOPHEN 325 MG/1
975 TABLET ORAL ONCE
OUTPATIENT
Start: 2025-06-03 | End: 2025-06-03

## 2025-06-03 RX ORDER — CELECOXIB 200 MG/1
400 CAPSULE ORAL ONCE
OUTPATIENT
Start: 2025-06-03 | End: 2025-06-03

## 2025-06-03 ASSESSMENT — ENCOUNTER SYMPTOMS
OCCASIONAL FEELINGS OF UNSTEADINESS: 0
LOSS OF SENSATION IN FEET: 0
DEPRESSION: 0

## 2025-06-03 ASSESSMENT — PAIN SCALES - GENERAL: PAINLEVEL_OUTOF10: 0-NO PAIN

## 2025-06-04 ENCOUNTER — TELEPHONE (OUTPATIENT)
Dept: GYNECOLOGIC ONCOLOGY | Facility: HOSPITAL | Age: 70
End: 2025-06-04
Payer: MEDICARE

## 2025-06-04 LAB
CANCER AG125 SERPL-ACNC: 7.2 U/ML (ref 0–30.2)
CANCER AG19-9 SERPL-ACNC: 12.86 U/ML
CEA SERPL-MCNC: 0.8 UG/L
HOLD SPECIMEN: NORMAL

## 2025-06-04 NOTE — TELEPHONE ENCOUNTER
Per Dr. Lai, patient was notified that her UA did not show any signs of infection.  Patient verbalized understanding and agreement regarding discussed information via verbal feedback.

## 2025-06-05 ENCOUNTER — APPOINTMENT (OUTPATIENT)
Dept: CARDIOLOGY | Facility: CLINIC | Age: 70
End: 2025-06-05
Payer: MEDICARE

## 2025-06-05 VITALS
HEIGHT: 62 IN | HEART RATE: 73 BPM | SYSTOLIC BLOOD PRESSURE: 112 MMHG | WEIGHT: 189 LBS | DIASTOLIC BLOOD PRESSURE: 80 MMHG | BODY MASS INDEX: 34.78 KG/M2

## 2025-06-05 DIAGNOSIS — I47.10 SVT (SUPRAVENTRICULAR TACHYCARDIA): Primary | ICD-10-CM

## 2025-06-05 DIAGNOSIS — R00.2 PALPITATIONS: ICD-10-CM

## 2025-06-05 LAB
ATRIAL RATE: 65 BPM
BACTERIA UR CULT: NO GROWTH
P AXIS: 61 DEGREES
P OFFSET: 204 MS
P ONSET: 147 MS
PR INTERVAL: 140 MS
Q ONSET: 217 MS
QRS COUNT: 10 BEATS
QRS DURATION: 78 MS
QT INTERVAL: 532 MS
QTC CALCULATION(BAZETT): 553 MS
QTC FREDERICIA: 546 MS
R AXIS: -11 DEGREES
T AXIS: 14 DEGREES
T OFFSET: 483 MS
VENTRICULAR RATE: 65 BPM

## 2025-06-05 PROCEDURE — 1036F TOBACCO NON-USER: CPT | Performed by: INTERNAL MEDICINE

## 2025-06-05 PROCEDURE — 3008F BODY MASS INDEX DOCD: CPT | Performed by: INTERNAL MEDICINE

## 2025-06-05 PROCEDURE — 99203 OFFICE O/P NEW LOW 30 MIN: CPT | Performed by: INTERNAL MEDICINE

## 2025-06-05 PROCEDURE — 93000 ELECTROCARDIOGRAM COMPLETE: CPT | Performed by: INTERNAL MEDICINE

## 2025-06-05 PROCEDURE — 1159F MED LIST DOCD IN RCRD: CPT | Performed by: INTERNAL MEDICINE

## 2025-06-05 RX ORDER — METOPROLOL SUCCINATE 25 MG/1
25 TABLET, EXTENDED RELEASE ORAL DAILY
Qty: 30 TABLET | Refills: 11 | Status: SHIPPED | OUTPATIENT
Start: 2025-06-05 | End: 2026-06-05

## 2025-06-05 NOTE — PROGRESS NOTES
Subjective:  The patient is a 69-year-old white female who presents in referral from Dr. Shiraz Patrick because of episodic palpitations.  The patient is followed primarily by Dr. Mirella Morales.    The patient has been in excellent overall health apart from mild hyperlipidemia.  She had a urinary tract infection recently and underwent an abdominal CT scan as part of the workup, showing a large ovarian cyst that is felt to be benign.  She is scheduled for a laparoscopic resection of the cyst with presumed unilateral oophorectomy on 7/7/2025.    The patient reports intermittent minor palpitations, described as to her beating rapidly for just a few seconds at a time.  These have never caused near-syncope or syncope.  They have never lasted for minutes or hours at a time.  She was hospitalized with these issues in March 2025 and underwent a cardiac workup per Dr. Patrick.  A a 14-day event monitor was worn by the patient from 3/19/2025 to 4/1/2025 n echocardiogram showed normal left ventricular systolic function and a stress echocardiogram was negative for any evidence of coronary disease.,  Showing an average heart rate of 70 bpm (range 53 to 130 bpm), but 9 separate episodes of nonsustained intra-atrial tachycardia lasting up to 18 beats in duration.  She has not been specifically treated with pharmacologic therapy for this problem.  She reports that her episodes occur most commonly when under emotional stress.    The patient is  and lives in [place] with her .  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  She is a non-smoker and does not drink alcohol.  She and her  enjoy golfing.    Current Outpatient Medications   Medication Sig    busPIRone (Buspar) 5 mg tablet Take 1 tablet (5 mg) by mouth 2 times a day.    lansoprazole (Prevacid) 15 mg DR capsule Take 1 capsule (15 mg) by mouth once daily in the morning. Take  "before meals. Do not crush or chew.    metroNIDAZOLE (Metrocream) 0.75 % cream APPLY TO THE AFFECTED AREA OF face TWICE DAILY    multivitamin tablet Take 1 tablet by mouth once daily.    omega-3 acid ethyl esters (Lovaza) 1 gram capsule Take 1 capsule (1 g) by mouth once daily.    PARoxetine (Paxil) 20 mg tablet Take 1 tablet (20 mg) by mouth once daily in the morning. Take before meals.    simvastatin (Zocor) 20 mg tablet Take 1 tablet (20 mg) by mouth once daily at bedtime.    vitamin D3-vitamin K2, MK4, (K2 Plus D3) 1,000-100 unit-mcg tablet Take 1,000 Units by mouth early in the morning..    saccharomyces boulardii (Florastor) 250 mg capsule Take 1 capsule (250 mg) by mouth once daily.     Allergies:  Patient has no known allergies.     Patient Active Problem List  Diagnosis    Palpitations    Depression    HLD (hyperlipidemia)    Pelvic mass in female      Objective:  Vitals:    06/05/25 1003   BP: 112/80   Pulse: 73   Height:     1.579 m (5' 2.17\")  Vitals:    06/05/25 1003   Weight: 85.7 kg (189 lb)     Exam:  Gen: Vivacious woman in no distress; alert and oriented.  HEENT: No scleral icterus.  Neck: No jugular venous distention or thyromegaly.  Lungs: Clear to auscultation, with no wheezes or rales.  Heart: Regular rhythm with positive S4 but no murmurs noted.  Abdomen: Benign, with no organomegaly or masses.  Extremities: Intact distal pulses; no edema.  Neuro: No focal neurologic abnormalities.  Skin: No cutaneous lesions.    EKG: An EKG on 6/5/2025 showed sinus rhythm at 73 bpm with low voltage throughout, and poor R wave progression from leads V1 through V4.    Impressions:  1.  Episodic palpitations, felt to be due to intra-atrial tachycardia from an irritable atrial focus.  This has never been sustained, but these rhythms can predispose to future atrial fibrillation.  2.  Ovarian cyst, scheduled for laparoscopic removal next month.    3.  Hyperlipidemia, on statin therapy.    Recommendations:  1.  The " patient will be started on Toprol-XL 25 mg daily for 2 basic purposes.  The first is because her palpitations are symptomatic, and the beta-blocker may decrease her PAC frequency from irritable focus and minimize her runs of atrial tachycardia.  The second reason to treat her is to help prevent future atrial fibrillation that may be triggered by atrial tachycardia.  2.  The patient will follow-up with Dr. Patrick and be seen by electrophysiology on a PRN basis.  3.  Should she ever have sustained atrial arrhythmias in the future, we will be happy to see her again and discuss specific antiarrhythmic therapy or ablation options.      Mehdi Barakat MD

## 2025-06-20 ENCOUNTER — PRE-ADMISSION TESTING (OUTPATIENT)
Dept: PREADMISSION TESTING | Facility: HOSPITAL | Age: 70
End: 2025-06-20
Payer: MEDICARE

## 2025-06-20 VITALS
SYSTOLIC BLOOD PRESSURE: 130 MMHG | DIASTOLIC BLOOD PRESSURE: 73 MMHG | HEIGHT: 62 IN | TEMPERATURE: 98.3 F | HEART RATE: 54 BPM | BODY MASS INDEX: 34.98 KG/M2 | OXYGEN SATURATION: 98 % | WEIGHT: 190.1 LBS

## 2025-06-20 DIAGNOSIS — R19.00 PELVIC MASS IN FEMALE: ICD-10-CM

## 2025-06-20 DIAGNOSIS — Z01.818 PREOPERATIVE EXAMINATION: Primary | ICD-10-CM

## 2025-06-20 LAB
ABO GROUP (TYPE) IN BLOOD: NORMAL
ANION GAP SERPL CALC-SCNC: 14 MMOL/L (ref 10–20)
ANTIBODY SCREEN: NORMAL
BUN SERPL-MCNC: 15 MG/DL (ref 6–23)
CALCIUM SERPL-MCNC: 9.7 MG/DL (ref 8.6–10.6)
CHLORIDE SERPL-SCNC: 104 MMOL/L (ref 98–107)
CO2 SERPL-SCNC: 28 MMOL/L (ref 21–32)
CREAT SERPL-MCNC: 0.72 MG/DL (ref 0.5–1.05)
EGFRCR SERPLBLD CKD-EPI 2021: >90 ML/MIN/1.73M*2
ERYTHROCYTE [DISTWIDTH] IN BLOOD BY AUTOMATED COUNT: 13.4 % (ref 11.5–14.5)
GLUCOSE SERPL-MCNC: 86 MG/DL (ref 74–99)
HCT VFR BLD AUTO: 44.5 % (ref 36–46)
HGB BLD-MCNC: 13.9 G/DL (ref 12–16)
MCH RBC QN AUTO: 26.5 PG (ref 26–34)
MCHC RBC AUTO-ENTMCNC: 31.2 G/DL (ref 32–36)
MCV RBC AUTO: 85 FL (ref 80–100)
NRBC BLD-RTO: 0 /100 WBCS (ref 0–0)
PLATELET # BLD AUTO: 215 X10*3/UL (ref 150–450)
POTASSIUM SERPL-SCNC: 4.7 MMOL/L (ref 3.5–5.3)
RBC # BLD AUTO: 5.24 X10*6/UL (ref 4–5.2)
RH FACTOR (ANTIGEN D): NORMAL
SODIUM SERPL-SCNC: 141 MMOL/L (ref 136–145)
WBC # BLD AUTO: 7.4 X10*3/UL (ref 4.4–11.3)

## 2025-06-20 PROCEDURE — 86901 BLOOD TYPING SEROLOGIC RH(D): CPT

## 2025-06-20 PROCEDURE — 36415 COLL VENOUS BLD VENIPUNCTURE: CPT

## 2025-06-20 PROCEDURE — 85027 COMPLETE CBC AUTOMATED: CPT

## 2025-06-20 PROCEDURE — 99204 OFFICE O/P NEW MOD 45 MIN: CPT

## 2025-06-20 PROCEDURE — 80048 BASIC METABOLIC PNL TOTAL CA: CPT

## 2025-06-20 RX ORDER — IBUPROFEN 600 MG/1
1 TABLET, FILM COATED ORAL EVERY 8 HOURS PRN
COMMUNITY
Start: 2024-07-10

## 2025-06-20 ASSESSMENT — DUKE ACTIVITY SCORE INDEX (DASI)
CAN YOU TAKE CARE OF YOURSELF (EAT, DRESS, BATHE, OR USE TOILET): YES
CAN YOU RUN A SHORT DISTANCE: YES
CAN YOU WALK INDOORS, SUCH AS AROUND YOUR HOUSE: YES
CAN YOU CLIMB A FLIGHT OF STAIRS OR WALK UP A HILL: YES
TOTAL_SCORE: 58.2
CAN YOU DO LIGHT WORK AROUND THE HOUSE LIKE DUSTING OR WASHING DISHES: YES
CAN YOU DO YARD WORK LIKE RAKING LEAVES, WEEDING OR PUSHING A MOWER: YES
CAN YOU DO HEAVY WORK AROUND THE HOUSE LIKE SCRUBBING FLOORS OR LIFTING AND MOVING HEAVY FURNITURE: YES
CAN YOU DO MODERATE WORK AROUND THE HOUSE LIKE VACUUMING, SWEEPING FLOORS OR CARRYING GROCERIES: YES
CAN YOU HAVE SEXUAL RELATIONS: YES
CAN YOU WALK A BLOCK OR TWO ON LEVEL GROUND: YES
DASI METS SCORE: 9.9
CAN YOU PARTICIPATE IN STRENOUS SPORTS LIKE SWIMMING, SINGLES TENNIS, FOOTBALL, BASKETBALL, OR SKIING: YES
CAN YOU PARTICIPATE IN MODERATE RECREATIONAL ACTIVITIES LIKE GOLF, BOWLING, DANCING, DOUBLES TENNIS OR THROWING A BASEBALL OR FOOTBALL: YES

## 2025-06-20 ASSESSMENT — ENCOUNTER SYMPTOMS
EYE DISCHARGE: 0
LIMITED RANGE OF MOTION: 0
TROUBLE SWALLOWING: 0
DIARRHEA: 0
SINUS CONGESTION: 0
LIGHT-HEADEDNESS: 0
DYSURIA: 0
DIFFICULTY URINATING: 0
DYSPNEA WITH EXERTION: 0
COUGH: 0
DOUBLE VISION: 0
CHILLS: 0
ABDOMINAL PAIN: 0
POLYDIPSIA: 0
RHINORRHEA: 0
VOMITING: 0
CONFUSION: 0
NECK PAIN: 0
ARTHRALGIAS: 0
NECK STIFFNESS: 0
NECK SWELLING: 0
ABDOMINAL DISTENTION: 0
JOINT SWELLING: 0
NECK MASS: 0
UNEXPECTED WEIGHT CHANGE: 0
NERVOUS/ANXIOUS: 0
VISUAL CHANGE: 0
SHORTNESS OF BREATH: 0
VERTIGO: 0
WHEEZING: 0
PND: 0
NUMBNESS: 0
EXCESSIVE BLEEDING: 0
TREMORS: 0
WOUND: 0
NAUSEA: 0
SKIN CHANGES: 0
HEMOPTYSIS: 0
BRUISES/BLEEDS EASILY: 0
BLOOD IN STOOL: 0
MYALGIAS: 0
VOICE CHANGE: 0
DYSPNEA AT REST: 0
FEVER: 0
WEAKNESS: 0
CONSTIPATION: 0
PALPITATIONS: 0

## 2025-06-20 ASSESSMENT — PAIN - FUNCTIONAL ASSESSMENT: PAIN_FUNCTIONAL_ASSESSMENT: 0-10

## 2025-06-20 ASSESSMENT — LIFESTYLE VARIABLES: SMOKING_STATUS: NONSMOKER

## 2025-06-20 ASSESSMENT — PAIN SCALES - GENERAL: PAINLEVEL_OUTOF10: 0 - NO PAIN

## 2025-06-20 NOTE — PREPROCEDURE INSTRUCTIONS
Thank you for visiting [facility] ([facility]) today for your pre-procedure evaluation, you were seen by     Jennifer Spivey PA-C   Department of Anesthesiology and Perioperative Medicine  Main phone [phone]  Fax [phone]    This summary includes instructions and information to aid you during your perioperative period.  Please read carefully. If you have any questions about your visit today, please call the number listed above.  If you become ill or have any changes to your health before your surgery, please contact your primary care provider and alert your surgeon.    General Medications Instructions (see back for further medication instructions)  Hold Vit D supplementation day of surgery  Hold all other vitamins and supplements 7 days prior to surgery  Tylenol okay to continue, please hold Aleve/naproxen/ibuprofen (NSAIDs) for 7 days prior to surgery  No lotion/moisturizers or Deoderant after last shower prior to surgery    You will be called business day prior to surgery to confirm arrival time.     Preparing for Surgery       Preoperative Fasting Guidelines  Why must I stop eating and drinking near surgery time?  With sedation, food or liquid in your stomach can enter your lungs causing serious complications  Food can increase nausea and vomiting  When do I need to stop eating and drinking before my surgery?  Do not eat any food after midnight the night before your surgery/procedure.  You may have up to 13.5 ounces of clear liquid until TWO hours before your instructed arrival time to the hospital.  This includes water, black tea/coffee, (no milk or cream) apple juice, and electrolyte drinks (Gatorade)  You may chew gum until TWO hours before your surgery/procedure      Tobacco and Alcohol;  Do not drink alcohol or smoke within 24 hours of surgery.  It is best to quit smoking for as long as possible before any surgery or procedure.      The Week before Surgery        Seven days  before Surgery  Check your [facility] medication instructions  Do the exercises provided to you by [facility]   Arrange for a responsible, adult licensed  to take you home after surgery and stay with you for 24 hours.  You will not be permitted to drive yourself home if you have received any anesthetic/sedation  Five days before surgery  Check your [facility] medication instructions  Do the exercises provided to you by [facility]   Start using Chlorhexidene (CHG) body wash if prescribed (Continue till day of surgery)      The Day before Surgery       Check your [facility] medication and all other [facility] instructions including when to stop eating and drinking  You will be called with your arrival time for surgery in the late afternoon.  If you do not receive a call please reach out to your surgeon's office.  Do not smoke or drink 24 hours before surgery  Prepare items to bring with you to the hospital  Shower with your chlorhexidine wash if prescribed  Brush your teeth and use your chlorhexidine dental rinse if prescribed    The Day of Surgery       Check your [facility] medication instructions  Shower, if prescribed use CHG.  Do not apply any lotions, creams, moisturizers, perfume or deodorant  Brush your teeth and use your CHG dental rinse if prescribed  Wear loose comfortable clothing  Avoid make-up  Remove  jewelry and piercings, consider professional piercing removal with a plastic spacer if needed  Bring photo ID and Insurance card  Bring an accurate medication list that includes medication dose, frequency and allergies  Bring a copy of your advanced directives (will, health care power of )  Bring any devices and controllers as well as medical devices you have been provided with for surgery (CPAP, slings, braces, etc.)  Dentures, eyeglasses, and contacts will be removed before surgery, please bring cases for contacts or glasses    Preoperative Deep Breathing Exercises    Why it is important to do deep breathing exercises before my surgery?   Deep breathing exercises strengthen your breathing muscles.  This helps you to recover after your surgery and decreases the chance of breathing complications.    How are the deep breathing exercises done?  Sit straight with your back supported.  Breathe in deeply and slowly through your nose. Your lower rib cage should expand and your abdomen may move forward.  Hold that breath for 3 to 5 seconds.  Breathe out through pursed lips, slowly and completely.  Rest and repeat 10 times every hour while awake.  Rest longer if you become dizzy or lightheaded.      Preoperative Brain Exercises    What are brain exercises?  A brain exercise is any activity that engages your thinking (cognitive) skills.    What types of activities are considered brain exercises?  Jigsaw puzzles, crossword puzzles, word jumble, memory games, word search, and many more.  Many can be found free online or on your phone via a mobile alie.    Why should I do brain exercises before my surgery?  More recent research has shown brain exercise before surgery can lower the risk of postoperative delirium (confusion) which can be especially important for older adults.  Patients who did brain exercises for 5 to 10 hours the days before surgery, cut their risk of postoperative delirium in half up to 1 week after surgery.    Sit-to-Stand Exercise    What is the sit-to-stand exercise?  The sit-to-stand exercise strengthens the muscles of your lower body and muscles in the center of your body (core muscles for stability) helping to maintain and improve your strength and mobility.  How do I do the sit-to-stand exercise?  The goal is to do this exercise without using your arms or hands.  If this is too difficult, use your arms and hands or a chair with armrests to help slowly push yourself to the standing position and lower yourself back to the sitting position. As the movement becomes easier use your arms and hands less.    Steps to the sit-to-stand exercise  Sit  up tall in a sturdy chair, knees bent, feet flat on the floor shoulder-width apart.  Shift your hips/pelvis forward in the chair to correctly position yourself for the next movement.  Lean forward at your hips.  Stand up straight putting equal weight on both feet.  Check to be sure you are properly aligned with the chair, in a slow controlled movement sit back down.  Repeat this exercise 10-15 times.  If needed you can do it fewer times until your strength improves.  Rest for 1 minute.  Do another 10-15 sit-to-stand exercises.  Try to do this in the morning and evening.       Simple things you can do to help prevent blood clots     Blood clots are blockages that can form in the body's veins. When a blood clot forms in your deep veins, it may be called a deep vein thrombosis, or DVT for short. Blood clots can happen in any part of the body where blood flows, but they are most common in the arms and legs. If a piece of a blood clot breaks free and travels to the lungs, it is called a pulmonary embolus (PE). A PE can be a very serious problem.      Being in the hospital or having surgery can raise your chances of getting a blood clot because you may not be well enough to move around as much as you normally do.         Ways you can help prevent blood clots in the hospital       Wearing SCDs  SCDs stands for Sequential Compression Devices.   SCDs are special sleeves that wrap around your legs. They attach to a pump that fills them with air to gently squeeze your legs every few minutes.  This helps return the blood in your legs to your heart.   SCDs should only be taken off when walking or bathing. SCDs may not be comfortable, but they can help save your life.              Pump SCD leg sleeves  Wearing compression stockings - if your doctor orders them. These special snug-fitting stockings gently squeeze your legs to help blood flow.       Walking. Walking helps move the blood in your legs.   If your doctor says it is ok,  try walking the halls at least   5 times a day. Ask us to help you get up, so you don't fall.      Taking any blood-thinning medicines your doctor orders.              Ways you can help prevent blood clots at home         Wearing compression stockings - if your doctor orders them.   Walking - to help move the blood in your legs.    Taking any blood-thinning medicines your doctor orders.      Signs of a blood clot or PE    Tell your doctor or nurse right away if you have any of the problems listed below.         If you are at home, seek medical care right away. Call 911 for chest pain or problems breathing.            Signs of a blood clot (DVT) - such as pain, swelling, redness, or warmth in your arm or legs.  Signs of a pulmonary embolism (PE) - such as chest pain or feeling short of breath

## 2025-06-20 NOTE — NURSING NOTE
Patient seen in PAT. Orders reviewed with PAT Provider.     Following labs obtained by documenting RN:  BMP, T/S, CBC.    Patient discharged with: Pre-procedure instructions/AVS.    Marissa COLLADON, RN  [facility]   [facility]

## 2025-07-01 ENCOUNTER — APPOINTMENT (OUTPATIENT)
Dept: CARDIOLOGY | Facility: HOSPITAL | Age: 70
End: 2025-07-01
Payer: MEDICARE

## 2025-07-01 NOTE — HOSPITAL COURSE
[x] PAT  [x] Plan for overnight obs? No  [x] Consent  [x] Preop meds  [x] Add to list    Gynecologic Oncology Surgical History and Physical    Yomaira Alanis is a 69 y.o. female with a pelvic mass, presenting for laparoscopic removal of pelvic mass, bilateral salpingo-oophorecytomy, possible staging, any other indicated procedures.    PAT (25): complete    PMHx: depression, HLD, heart palpitations, GERD  SurgHx: carpal tunnel release    Imaging/pathology  CT A/P w IV contrast 25  Impression: There is mild bladder wall thickening, mucosal hyperenhancement and  perivesical inflammatory stranding concerning for infectious  cystitis. Correlate with urinalysis.      There is a 6.1 x 7.0 x 5.9 cm hypodense lesion in the left adnexa  likely relate to left ovary. A postmenopausal female differential  considerations include cyst versus cystic ovarian neoplasm. Given  size gynecological consultation and further evaluation with  nonemergent ultrasound and/or MRI is recommended.    Tumor Markers:  Lab Results   Component Value Date     7.2 2025     12.86 2025    CEA 0.8 2025       Obstetrical History   OB History    Para Term  AB Living   0 0 0      SAB IAB Ectopic Multiple Live Births              Past Medical History  Past Medical History:   Diagnosis Date    Anxiety     Arrhythmia 2025    Nonsustained intra-atrial tachycardia    Depression     HLD (hyperlipidemia)     Obesity Lifetime    Yes        Past Surgical History   Past Surgical History:   Procedure Laterality Date    CARPAL TUNNEL RELEASE Bilateral     Both hands    CATARACT EXTRACTION      COLONOSCOPY      OTHER SURGICAL HISTORY         Family History:  Family History   Problem Relation Name Age of Onset    Breast cancer Mother Lindsay Curry 88    Cancer Mother Lindsay Curry     Heart attack Mother Lindsay Curry     No Known Problems Father      Cancer Brother Michele Curry     Cancer Brother Michele Antoinette      Depression Maternal Grandmother Quynh Curry     Stroke Maternal Grandfather      Ovarian cancer Mother's Sister Leighton Kelly 80    Cancer Mother's Sister Leighton Kelly        Social History  Social History     Tobacco Use    Smoking status: Never    Smokeless tobacco: Never   Substance Use Topics    Alcohol use: Not Currently     Comment: Rarely     Substance and Sexual Activity   Drug Use Never       Allergies  Patient has no known allergies.     Medications  Medications Prior to Admission   Medication Sig Dispense Refill Last Dose/Taking    busPIRone (Buspar) 5 mg tablet Take 1 tablet (5 mg) by mouth 2 times a day. (Patient not taking: Reported on 6/20/2025)       ibuprofen 600 mg tablet Take 1 tablet (600 mg) by mouth every 8 hours if needed for pain.       lansoprazole (Prevacid) 15 mg DR capsule Take 1 capsule (15 mg) by mouth once daily in the morning. Take before meals. Do not crush or chew.       metoprolol succinate XL (Toprol-XL) 25 mg 24 hr tablet Take 1 tablet (25 mg) by mouth once daily. Do not crush or chew. 30 tablet 11     metroNIDAZOLE (Metrocream) 0.75 % cream APPLY TO THE AFFECTED AREA OF face TWICE DAILY       multivitamin tablet Take 1 tablet by mouth once daily.       omega-3 acid ethyl esters (Lovaza) 1 gram capsule Take 1 capsule (1 g) by mouth once daily.       PARoxetine (Paxil) 20 mg tablet Take 1 tablet (20 mg) by mouth once daily in the morning. Take before meals.       simvastatin (Zocor) 20 mg tablet Take 1 tablet (20 mg) by mouth once daily at bedtime.       vitamin D3-vitamin K2, MK4, (K2 Plus D3) 1,000-100 unit-mcg tablet Take 1,000 Units by mouth early in the morning..          Objective    Last Vitals  There were no vitals taken for this visit.    Physical Examination  General: no acute distress  HEENT: normocephalic, atraumatic  Heart: warm and well perfused  Lungs: breathing comfortably on room air  Abdomen: nondistended  Extremities: moving all extremities  Neuro: awake and  conversant  Psych: appropriate mood and affect    Lab Review          Lab Results   Component Value Date    WBC 7.4 06/20/2025    HGB 13.9 06/20/2025    HCT 44.5 06/20/2025    MCV 85 06/20/2025     06/20/2025       Lab Results   Component Value Date    GLUCOSE 86 06/20/2025    CALCIUM 9.7 06/20/2025     06/20/2025    K 4.7 06/20/2025    CO2 28 06/20/2025     06/20/2025    BUN 15 06/20/2025    CREATININE 0.72 06/20/2025       Assessment/Plan     Yomaira Alanis is a 69 y.o. with a pelvic mass presenting for scheduled surgery.    Plan to proceed with with a laparoscopic removal of pelvic mass, bilateral salpingo-oophorecytomy, possible staging, and any other indicated procedures   Surgical consent was reviewed prior to arrival to pre-op area. Questions answered to her satisfaction.    Thelma Bauman MD  Obstetrics & Gynecology, PGY-1  Gynecologic Oncology  Pager [phone]  Phone [phone]

## 2025-07-06 ENCOUNTER — ANESTHESIA EVENT (OUTPATIENT)
Dept: OPERATING ROOM | Facility: HOSPITAL | Age: 70
End: 2025-07-06
Payer: MEDICARE

## 2025-07-07 ENCOUNTER — HOSPITAL ENCOUNTER (OUTPATIENT)
Facility: HOSPITAL | Age: 70
Setting detail: OUTPATIENT SURGERY
Discharge: HOME | End: 2025-07-07
Attending: STUDENT IN AN ORGANIZED HEALTH CARE EDUCATION/TRAINING PROGRAM | Admitting: STUDENT IN AN ORGANIZED HEALTH CARE EDUCATION/TRAINING PROGRAM
Payer: MEDICARE

## 2025-07-07 ENCOUNTER — ANESTHESIA (OUTPATIENT)
Dept: OPERATING ROOM | Facility: HOSPITAL | Age: 70
End: 2025-07-07
Payer: MEDICARE

## 2025-07-07 VITALS
WEIGHT: 190.04 LBS | OXYGEN SATURATION: 93 % | HEIGHT: 62 IN | TEMPERATURE: 97.2 F | DIASTOLIC BLOOD PRESSURE: 65 MMHG | RESPIRATION RATE: 17 BRPM | SYSTOLIC BLOOD PRESSURE: 111 MMHG | BODY MASS INDEX: 34.97 KG/M2 | HEART RATE: 63 BPM

## 2025-07-07 DIAGNOSIS — Z98.890 STATUS POST LAPAROSCOPY: ICD-10-CM

## 2025-07-07 DIAGNOSIS — R19.00 PELVIC MASS IN FEMALE: Primary | ICD-10-CM

## 2025-07-07 LAB
ABO GROUP (TYPE) IN BLOOD: NORMAL
ANTIBODY SCREEN: NORMAL
RH FACTOR (ANTIGEN D): NORMAL

## 2025-07-07 PROCEDURE — 88112 CYTOPATH CELL ENHANCE TECH: CPT | Mod: TC,MCY | Performed by: STUDENT IN AN ORGANIZED HEALTH CARE EDUCATION/TRAINING PROGRAM

## 2025-07-07 PROCEDURE — 88360 TUMOR IMMUNOHISTOCHEM/MANUAL: CPT | Mod: TC,SUR,WESLAB | Performed by: STUDENT IN AN ORGANIZED HEALTH CARE EDUCATION/TRAINING PROGRAM

## 2025-07-07 PROCEDURE — 88307 TISSUE EXAM BY PATHOLOGIST: CPT | Performed by: STUDENT IN AN ORGANIZED HEALTH CARE EDUCATION/TRAINING PROGRAM

## 2025-07-07 PROCEDURE — A58661 PR LAP,RMV  ADNEXAL STRUCTURE: Performed by: ANESTHESIOLOGY

## 2025-07-07 PROCEDURE — 7100000010 HC PHASE TWO TIME - EACH INCREMENTAL 1 MINUTE: Performed by: STUDENT IN AN ORGANIZED HEALTH CARE EDUCATION/TRAINING PROGRAM

## 2025-07-07 PROCEDURE — 2500000004 HC RX 250 GENERAL PHARMACY W/ HCPCS (ALT 636 FOR OP/ED): Performed by: NURSE ANESTHETIST, CERTIFIED REGISTERED

## 2025-07-07 PROCEDURE — 3700000001 HC GENERAL ANESTHESIA TIME - INITIAL BASE CHARGE: Performed by: STUDENT IN AN ORGANIZED HEALTH CARE EDUCATION/TRAINING PROGRAM

## 2025-07-07 PROCEDURE — 3600000009 HC OR TIME - EACH INCREMENTAL 1 MINUTE - PROCEDURE LEVEL FOUR: Performed by: STUDENT IN AN ORGANIZED HEALTH CARE EDUCATION/TRAINING PROGRAM

## 2025-07-07 PROCEDURE — 58661 LAPAROSCOPY REMOVE ADNEXA: CPT | Performed by: STUDENT IN AN ORGANIZED HEALTH CARE EDUCATION/TRAINING PROGRAM

## 2025-07-07 PROCEDURE — 88112 CYTOPATH CELL ENHANCE TECH: CPT | Performed by: PATHOLOGY

## 2025-07-07 PROCEDURE — 88305 TISSUE EXAM BY PATHOLOGIST: CPT | Performed by: STUDENT IN AN ORGANIZED HEALTH CARE EDUCATION/TRAINING PROGRAM

## 2025-07-07 PROCEDURE — 2500000001 HC RX 250 WO HCPCS SELF ADMINISTERED DRUGS (ALT 637 FOR MEDICARE OP): Performed by: STUDENT IN AN ORGANIZED HEALTH CARE EDUCATION/TRAINING PROGRAM

## 2025-07-07 PROCEDURE — 2720000007 HC OR 272 NO HCPCS: Performed by: STUDENT IN AN ORGANIZED HEALTH CARE EDUCATION/TRAINING PROGRAM

## 2025-07-07 PROCEDURE — 7100000009 HC PHASE TWO TIME - INITIAL BASE CHARGE: Performed by: STUDENT IN AN ORGANIZED HEALTH CARE EDUCATION/TRAINING PROGRAM

## 2025-07-07 PROCEDURE — 7100000001 HC RECOVERY ROOM TIME - INITIAL BASE CHARGE: Performed by: STUDENT IN AN ORGANIZED HEALTH CARE EDUCATION/TRAINING PROGRAM

## 2025-07-07 PROCEDURE — 3700000002 HC GENERAL ANESTHESIA TIME - EACH INCREMENTAL 1 MINUTE: Performed by: STUDENT IN AN ORGANIZED HEALTH CARE EDUCATION/TRAINING PROGRAM

## 2025-07-07 PROCEDURE — 86901 BLOOD TYPING SEROLOGIC RH(D): CPT | Performed by: STUDENT IN AN ORGANIZED HEALTH CARE EDUCATION/TRAINING PROGRAM

## 2025-07-07 PROCEDURE — 2500000004 HC RX 250 GENERAL PHARMACY W/ HCPCS (ALT 636 FOR OP/ED): Performed by: STUDENT IN AN ORGANIZED HEALTH CARE EDUCATION/TRAINING PROGRAM

## 2025-07-07 PROCEDURE — 7100000002 HC RECOVERY ROOM TIME - EACH INCREMENTAL 1 MINUTE: Performed by: STUDENT IN AN ORGANIZED HEALTH CARE EDUCATION/TRAINING PROGRAM

## 2025-07-07 PROCEDURE — 96372 THER/PROPH/DIAG INJ SC/IM: CPT | Performed by: STUDENT IN AN ORGANIZED HEALTH CARE EDUCATION/TRAINING PROGRAM

## 2025-07-07 PROCEDURE — 2500000004 HC RX 250 GENERAL PHARMACY W/ HCPCS (ALT 636 FOR OP/ED)

## 2025-07-07 PROCEDURE — 3600000004 HC OR TIME - INITIAL BASE CHARGE - PROCEDURE LEVEL FOUR: Performed by: STUDENT IN AN ORGANIZED HEALTH CARE EDUCATION/TRAINING PROGRAM

## 2025-07-07 PROCEDURE — 36415 COLL VENOUS BLD VENIPUNCTURE: CPT | Performed by: STUDENT IN AN ORGANIZED HEALTH CARE EDUCATION/TRAINING PROGRAM

## 2025-07-07 RX ORDER — ALBUTEROL SULFATE 0.83 MG/ML
2.5 SOLUTION RESPIRATORY (INHALATION) ONCE AS NEEDED
Status: CANCELLED | OUTPATIENT
Start: 2025-07-07

## 2025-07-07 RX ORDER — SODIUM CHLORIDE, SODIUM LACTATE, POTASSIUM CHLORIDE, CALCIUM CHLORIDE 600; 310; 30; 20 MG/100ML; MG/100ML; MG/100ML; MG/100ML
100 INJECTION, SOLUTION INTRAVENOUS CONTINUOUS
Status: CANCELLED | OUTPATIENT
Start: 2025-07-07 | End: 2025-07-08

## 2025-07-07 RX ORDER — GLYCOPYRROLATE 0.2 MG/ML
INJECTION INTRAMUSCULAR; INTRAVENOUS AS NEEDED
Status: DISCONTINUED | OUTPATIENT
Start: 2025-07-07 | End: 2025-07-07

## 2025-07-07 RX ORDER — ONDANSETRON 4 MG/1
4 TABLET, FILM COATED ORAL EVERY 6 HOURS PRN
Qty: 20 TABLET | Refills: 0 | Status: SHIPPED | OUTPATIENT
Start: 2025-07-07 | End: 2025-07-22 | Stop reason: WASHOUT

## 2025-07-07 RX ORDER — ACETAMINOPHEN 325 MG/1
975 TABLET ORAL ONCE
Status: COMPLETED | OUTPATIENT
Start: 2025-07-07 | End: 2025-07-07

## 2025-07-07 RX ORDER — MIDAZOLAM HYDROCHLORIDE 1 MG/ML
INJECTION INTRAMUSCULAR; INTRAVENOUS AS NEEDED
Status: DISCONTINUED | OUTPATIENT
Start: 2025-07-07 | End: 2025-07-07

## 2025-07-07 RX ORDER — OXYCODONE HYDROCHLORIDE 5 MG/1
5 TABLET ORAL EVERY 4 HOURS PRN
Refills: 0 | Status: CANCELLED | OUTPATIENT
Start: 2025-07-07

## 2025-07-07 RX ORDER — LIDOCAINE HYDROCHLORIDE 10 MG/ML
0.1 INJECTION, SOLUTION EPIDURAL; INFILTRATION; INTRACAUDAL; PERINEURAL ONCE
Status: CANCELLED | OUTPATIENT
Start: 2025-07-07 | End: 2025-07-07

## 2025-07-07 RX ORDER — ACETAMINOPHEN 325 MG/1
650 TABLET ORAL EVERY 6 HOURS PRN
Qty: 20 TABLET | Refills: 0 | Status: SHIPPED | OUTPATIENT
Start: 2025-07-07 | End: 2025-07-22 | Stop reason: WASHOUT

## 2025-07-07 RX ORDER — POLYETHYLENE GLYCOL 3350 17 G/17G
17 POWDER, FOR SOLUTION ORAL DAILY PRN
Qty: 10 PACKET | Refills: 0 | Status: SHIPPED | OUTPATIENT
Start: 2025-07-07 | End: 2025-07-22 | Stop reason: WASHOUT

## 2025-07-07 RX ORDER — MIDAZOLAM HYDROCHLORIDE 2 MG/2ML
1 INJECTION, SOLUTION INTRAMUSCULAR; INTRAVENOUS ONCE
Status: CANCELLED | OUTPATIENT
Start: 2025-07-07

## 2025-07-07 RX ORDER — FENTANYL CITRATE 50 UG/ML
INJECTION, SOLUTION INTRAMUSCULAR; INTRAVENOUS AS NEEDED
Status: DISCONTINUED | OUTPATIENT
Start: 2025-07-07 | End: 2025-07-07

## 2025-07-07 RX ORDER — LIDOCAINE HYDROCHLORIDE 20 MG/ML
INJECTION, SOLUTION INFILTRATION; PERINEURAL AS NEEDED
Status: DISCONTINUED | OUTPATIENT
Start: 2025-07-07 | End: 2025-07-07

## 2025-07-07 RX ORDER — PROPOFOL 10 MG/ML
INJECTION, EMULSION INTRAVENOUS AS NEEDED
Status: DISCONTINUED | OUTPATIENT
Start: 2025-07-07 | End: 2025-07-07

## 2025-07-07 RX ORDER — HYDROMORPHONE HYDROCHLORIDE 0.2 MG/ML
0.2 INJECTION INTRAMUSCULAR; INTRAVENOUS; SUBCUTANEOUS EVERY 5 MIN PRN
Status: CANCELLED | OUTPATIENT
Start: 2025-07-07

## 2025-07-07 RX ORDER — PHENYLEPHRINE HCL IN 0.9% NACL 0.4MG/10ML
SYRINGE (ML) INTRAVENOUS AS NEEDED
Status: DISCONTINUED | OUTPATIENT
Start: 2025-07-07 | End: 2025-07-07

## 2025-07-07 RX ORDER — ONDANSETRON HYDROCHLORIDE 2 MG/ML
INJECTION, SOLUTION INTRAVENOUS AS NEEDED
Status: DISCONTINUED | OUTPATIENT
Start: 2025-07-07 | End: 2025-07-07

## 2025-07-07 RX ORDER — APREPITANT 40 MG/1
40 CAPSULE ORAL ONCE
Status: DISCONTINUED | OUTPATIENT
Start: 2025-07-07 | End: 2025-07-07 | Stop reason: HOSPADM

## 2025-07-07 RX ORDER — ROCURONIUM BROMIDE 10 MG/ML
INJECTION, SOLUTION INTRAVENOUS AS NEEDED
Status: DISCONTINUED | OUTPATIENT
Start: 2025-07-07 | End: 2025-07-07

## 2025-07-07 RX ORDER — SODIUM CHLORIDE, SODIUM LACTATE, POTASSIUM CHLORIDE, CALCIUM CHLORIDE 600; 310; 30; 20 MG/100ML; MG/100ML; MG/100ML; MG/100ML
INJECTION, SOLUTION INTRAVENOUS CONTINUOUS PRN
Status: DISCONTINUED | OUTPATIENT
Start: 2025-07-07 | End: 2025-07-07

## 2025-07-07 RX ORDER — CELECOXIB 200 MG/1
400 CAPSULE ORAL ONCE
Status: COMPLETED | OUTPATIENT
Start: 2025-07-07 | End: 2025-07-07

## 2025-07-07 RX ORDER — DROPERIDOL 2.5 MG/ML
0.62 INJECTION, SOLUTION INTRAMUSCULAR; INTRAVENOUS ONCE AS NEEDED
Status: CANCELLED | OUTPATIENT
Start: 2025-07-07

## 2025-07-07 RX ORDER — HEPARIN SODIUM 5000 [USP'U]/ML
5000 INJECTION, SOLUTION INTRAVENOUS; SUBCUTANEOUS ONCE
Status: COMPLETED | OUTPATIENT
Start: 2025-07-07 | End: 2025-07-07

## 2025-07-07 RX ORDER — GABAPENTIN 300 MG/1
300 CAPSULE ORAL ONCE
Status: COMPLETED | OUTPATIENT
Start: 2025-07-07 | End: 2025-07-07

## 2025-07-07 RX ORDER — BUPIVACAINE HYDROCHLORIDE 5 MG/ML
INJECTION, SOLUTION PERINEURAL AS NEEDED
Status: DISCONTINUED | OUTPATIENT
Start: 2025-07-07 | End: 2025-07-07 | Stop reason: HOSPADM

## 2025-07-07 RX ORDER — ASPIRIN 81 MG
100 TABLET, DELAYED RELEASE (ENTERIC COATED) ORAL 2 TIMES DAILY
Qty: 10 TABLET | Refills: 0 | Status: SHIPPED | OUTPATIENT
Start: 2025-07-07 | End: 2025-07-12

## 2025-07-07 RX ORDER — MEPERIDINE HYDROCHLORIDE 25 MG/ML
12.5 INJECTION INTRAMUSCULAR; INTRAVENOUS; SUBCUTANEOUS EVERY 10 MIN PRN
Status: CANCELLED | OUTPATIENT
Start: 2025-07-07

## 2025-07-07 RX ORDER — METHOCARBAMOL 100 MG/ML
1000 INJECTION, SOLUTION INTRAMUSCULAR; INTRAVENOUS ONCE
Status: CANCELLED | OUTPATIENT
Start: 2025-07-07 | End: 2025-07-07

## 2025-07-07 RX ORDER — LABETALOL HYDROCHLORIDE 5 MG/ML
5 INJECTION, SOLUTION INTRAVENOUS ONCE AS NEEDED
Status: CANCELLED | OUTPATIENT
Start: 2025-07-07

## 2025-07-07 RX ORDER — IBUPROFEN 600 MG/1
600 TABLET, FILM COATED ORAL EVERY 6 HOURS PRN
Qty: 20 TABLET | Refills: 0 | Status: SHIPPED | OUTPATIENT
Start: 2025-07-07 | End: 2025-07-22 | Stop reason: WASHOUT

## 2025-07-07 RX ADMIN — GLYCOPYRROLATE 0.2 MG: 0.2 SOLUTION INTRAMUSCULAR; INTRAVENOUS at 16:44

## 2025-07-07 RX ADMIN — GABAPENTIN 300 MG: 600 TABLET, FILM COATED ORAL at 13:28

## 2025-07-07 RX ADMIN — ACETAMINOPHEN 975 MG: 325 TABLET ORAL at 13:27

## 2025-07-07 RX ADMIN — CELECOXIB 400 MG: 200 CAPSULE ORAL at 13:28

## 2025-07-07 RX ADMIN — SUGAMMADEX 400 MG: 100 INJECTION, SOLUTION INTRAVENOUS at 17:15

## 2025-07-07 RX ADMIN — FENTANYL CITRATE 50 MCG: 50 INJECTION, SOLUTION INTRAMUSCULAR; INTRAVENOUS at 15:15

## 2025-07-07 RX ADMIN — ROCURONIUM BROMIDE 10 MG: 10 INJECTION, SOLUTION INTRAVENOUS at 16:20

## 2025-07-07 RX ADMIN — ONDANSETRON 4 MG: 2 INJECTION INTRAMUSCULAR; INTRAVENOUS at 16:45

## 2025-07-07 RX ADMIN — GLYCOPYRROLATE 0.2 MG: 0.2 SOLUTION INTRAMUSCULAR; INTRAVENOUS at 16:42

## 2025-07-07 RX ADMIN — MIDAZOLAM HYDROCHLORIDE 2 MG: 2 INJECTION, SOLUTION INTRAMUSCULAR; INTRAVENOUS at 15:07

## 2025-07-07 RX ADMIN — PROPOFOL 150 MG: 10 INJECTION, EMULSION INTRAVENOUS at 15:15

## 2025-07-07 RX ADMIN — Medication 120 MCG: at 15:25

## 2025-07-07 RX ADMIN — ROCURONIUM BROMIDE 50 MG: 10 INJECTION, SOLUTION INTRAVENOUS at 15:17

## 2025-07-07 RX ADMIN — LIDOCAINE HYDROCHLORIDE 50 MG: 20 INJECTION, SOLUTION INFILTRATION; PERINEURAL at 15:15

## 2025-07-07 RX ADMIN — DEXAMETHASONE SODIUM PHOSPHATE 4 MG: 4 INJECTION INTRA-ARTICULAR; INTRALESIONAL; INTRAMUSCULAR; INTRAVENOUS; SOFT TISSUE at 16:24

## 2025-07-07 RX ADMIN — HEPARIN SODIUM 5000 UNITS: 5000 INJECTION, SOLUTION INTRAVENOUS; SUBCUTANEOUS at 14:11

## 2025-07-07 RX ADMIN — SODIUM CHLORIDE, POTASSIUM CHLORIDE, SODIUM LACTATE AND CALCIUM CHLORIDE: 600; 310; 30; 20 INJECTION, SOLUTION INTRAVENOUS at 15:11

## 2025-07-07 RX ADMIN — FENTANYL CITRATE 50 MCG: 50 INJECTION, SOLUTION INTRAMUSCULAR; INTRAVENOUS at 16:38

## 2025-07-07 ASSESSMENT — PAIN SCALES - GENERAL
PAINLEVEL_OUTOF10: 0 - NO PAIN

## 2025-07-07 ASSESSMENT — PAIN - FUNCTIONAL ASSESSMENT
PAIN_FUNCTIONAL_ASSESSMENT: 0-10

## 2025-07-07 ASSESSMENT — COLUMBIA-SUICIDE SEVERITY RATING SCALE - C-SSRS
2. HAVE YOU ACTUALLY HAD ANY THOUGHTS OF KILLING YOURSELF?: NO
6. HAVE YOU EVER DONE ANYTHING, STARTED TO DO ANYTHING, OR PREPARED TO DO ANYTHING TO END YOUR LIFE?: NO
1. IN THE PAST MONTH, HAVE YOU WISHED YOU WERE DEAD OR WISHED YOU COULD GO TO SLEEP AND NOT WAKE UP?: NO

## 2025-07-07 NOTE — DISCHARGE INSTRUCTIONS
Gynecologic Surgery Postoperative Instructions    Call the Gynecologic Oncology office at [phone] for any problems and/or concerns  *Walk as much as possible, it is ok to use stairs carefully  *Continue the coughing and deep breathing exercises that your learned in the hospital  *No lifting/straining greater than 10 pounds for 6 weeks (avoid strenuous activity)  *Prevent constipation by using stool softeners and staying hydrated, so that you do not strain against your stitches or have pain from constipation    Call doctor right away for:  *Fever above 100.4/shaking chills  *Bright red vaginal bleeding or bleeding that soaks more than two sanitary pads per hour  *A foul smelling discharge from the vagina  *Inability to urinate  *Severe pain or bloating in your abdomen  *Persistent nausea/vomiting  *Redness, swelling, or drainage at your incision sites  *Chest pain/shortness of breath-call 911.    You will be going home with prescriptions for pain medication. If you are able to take them, alternate a dose of Acetaminophen (Tylenol) and Ibuprofen every 3 hours. (For example, 650 mg of Tylenol at 09:00am, 600 mg Ibuprofen at 12:00pm, 650 mg of Tylenol at 3:00pm, 600mg Ibuprofen at 6:00pm). You may also take the two medicines together every 6 hours if that is easier. Use any prescribed narcotic (tramadol or oxycodone) for breakthrough pain as prescribed. Use a stool softener, such as Miralax to prevent constipation from the narcotic medication.     Your incisions have surgical glue. You may shower with the surgical glue in place. Allow warm, soapy water to run over your incisions, then pat to dry. Do not scrub the incisions. The surgical glue will start to peel off on its own in the a couple of days to a week.

## 2025-07-07 NOTE — H&P
Gynecologic Oncology Surgical History and Physical    Yomaira Alanis is a 69 y.o. female with a pelvic mass, presenting for laparoscopic removal of pelvic mass, bilateral salpingo-oophorecytomy, possible staging, any other indicated procedures.    PAT (25): complete    PMHx: depression, HLD, heart palpitations, GERD  SurgHx: carpal tunnel release    Imaging/pathology  CT A/P w IV contrast 25  Impression: There is mild bladder wall thickening, mucosal hyperenhancement and  perivesical inflammatory stranding concerning for infectious  cystitis. Correlate with urinalysis.      There is a 6.1 x 7.0 x 5.9 cm hypodense lesion in the left adnexa  likely relate to left ovary. A postmenopausal female differential  considerations include cyst versus cystic ovarian neoplasm. Given  size gynecological consultation and further evaluation with  nonemergent ultrasound and/or MRI is recommended.    Tumor Markers:  Lab Results   Component Value Date     7.2 2025     12.86 2025    CEA 0.8 2025       Obstetrical History   OB History    Para Term  AB Living   0 0 0      SAB IAB Ectopic Multiple Live Births              Past Medical History  Past Medical History:   Diagnosis Date    Anxiety     Arrhythmia 2025    Nonsustained intra-atrial tachycardia    Depression     HLD (hyperlipidemia)     Obesity Lifetime    Yes        Past Surgical History   Past Surgical History:   Procedure Laterality Date    CARPAL TUNNEL RELEASE Bilateral     Both hands    CATARACT EXTRACTION      COLONOSCOPY      OTHER SURGICAL HISTORY         Family History:  Family History   Problem Relation Name Age of Onset    Breast cancer Mother Lindsay Curry 88    Cancer Mother Lindsay Curry     Heart attack Mother Lindsay Curry     No Known Problems Father      Cancer Brother Michele Antoinette     Cancer Brother Michele Antoinette     Depression Maternal Grandmother Quynh Antoinette     Stroke Maternal Grandfather      Ovarian  cancer Mother's Sister Leighton Kelly 80    Cancer Mother's Sister Leighton Kelly        Social History  Social History     Tobacco Use    Smoking status: Never    Smokeless tobacco: Never   Substance Use Topics    Alcohol use: Not Currently     Comment: Rarely     Substance and Sexual Activity   Drug Use Never       Allergies  Patient has no known allergies.     Medications  Medications Prior to Admission   Medication Sig Dispense Refill Last Dose/Taking    busPIRone (Buspar) 5 mg tablet Take 1 tablet (5 mg) by mouth 2 times a day. (Patient not taking: Reported on 6/20/2025)       ibuprofen 600 mg tablet Take 1 tablet (600 mg) by mouth every 8 hours if needed for pain.       lansoprazole (Prevacid) 15 mg DR capsule Take 1 capsule (15 mg) by mouth once daily in the morning. Take before meals. Do not crush or chew.       metoprolol succinate XL (Toprol-XL) 25 mg 24 hr tablet Take 1 tablet (25 mg) by mouth once daily. Do not crush or chew. 30 tablet 11     metroNIDAZOLE (Metrocream) 0.75 % cream APPLY TO THE AFFECTED AREA OF face TWICE DAILY       multivitamin tablet Take 1 tablet by mouth once daily.       omega-3 acid ethyl esters (Lovaza) 1 gram capsule Take 1 capsule (1 g) by mouth once daily.       PARoxetine (Paxil) 20 mg tablet Take 1 tablet (20 mg) by mouth once daily in the morning. Take before meals.       simvastatin (Zocor) 20 mg tablet Take 1 tablet (20 mg) by mouth once daily at bedtime.       vitamin D3-vitamin K2, MK4, (K2 Plus D3) 1,000-100 unit-mcg tablet Take 1,000 Units by mouth early in the morning..          Objective    Vitals:    07/07/25 1315   BP: 142/69   Pulse: 57   Resp: 16   Temp: 36.5 °C (97.7 °F)   SpO2: 96%         Physical Examination  General: no acute distress  HEENT: normocephalic, atraumatic  Heart: warm and well perfused  Lungs: breathing comfortably on room air  Abdomen: nondistended  Extremities: moving all extremities  Neuro: awake and conversant  Psych: appropriate mood and  affect    Lab Review          Lab Results   Component Value Date    WBC 7.4 06/20/2025    HGB 13.9 06/20/2025    HCT 44.5 06/20/2025    MCV 85 06/20/2025     06/20/2025       Lab Results   Component Value Date    GLUCOSE 86 06/20/2025    CALCIUM 9.7 06/20/2025     06/20/2025    K 4.7 06/20/2025    CO2 28 06/20/2025     06/20/2025    BUN 15 06/20/2025    CREATININE 0.72 06/20/2025       Assessment/Plan     Yomaira Alanis is a 69 y.o. with a pelvic mass presenting for scheduled surgery.    Plan to proceed with with a laparoscopic removal of pelvic mass, bilateral salpingo-oophorecytomy, possible staging, and any other indicated procedures   Surgical consent was reviewed prior to arrival to pre-op area. Questions answered to her satisfaction.    To be discussed with Dr. Ming Jurado MD PGY3  Gynecologic Oncology  Pager [phone]  Phone [phone]

## 2025-07-07 NOTE — OP NOTE
Laparoscopic removal of pelvic mass, bilateral salpingo-oophorecytomy (B) Operative Note     Date: 2025  OR Location: [facility] OR    Name: Yomaira Alanis, : 1955, Age: 69 y.o., MRN: [id number], Sex: female    Diagnosis  Pre-op Diagnosis      * Pelvic mass in female [R19.00] Post-op Diagnosis     * Pelvic mass in female [R19.00]     Procedures  Laparoscopic removal of pelvic mass, bilateral salpingo-oophorecytomy  45809 - MD LAPAROSCOPY W/RMVL ADNEXAL STRUCTURES      Surgeons      * Roxie Lai - Primary    Resident/Fellow/Other Assistant:  Surgeons and Role:     * Anuradha Jurado MD - Resident - Assisting     * Rachel Wiggins MD MPH - Resident - Assisting     * Abdi Stallings MD - Fellow    Staff:   Circulator: Heather Spicer Person: Emelyn  Relief Circulator: Albertina    Anesthesia Staff: Anesthesiologist: Azael Burgos MD  CRNA: PRAVIN Palacio-CRNA  Anesthesia Resident: Clover Bautista MD  SRNA: Lio Weems    Procedure Summary  Anesthesia: General  ASA: II  Estimated Blood Loss: 10mL  Intra-op Medications: * Intraprocedure medication information is unavailable because the case start and end events have not been set *           Anesthesia Record               Intraprocedure I/O Totals          Intake    LR infusion 850.00 mL    Total Intake 850 mL       Output    Urine 35 mL    Total Output 35 mL       Net    Net Volume 815 mL          Specimen:   ID Type Source Tests Collected by Time   1 : pelvic washings Non-Gynecologic Cytology PELVIC WASHING CYTOLOGY CONSULTATION (NON-GYNECOLOGIC) Roxie Lai MD, MS 2025 1553   2 : right fallopian tube and ovary Tissue FALLOPIAN TUBE AND OVARY RIGHT SALPINGO-OOPHORECTOMY SURGICAL PATHOLOGY EXAM Roxie Lai MD, MS 2025 1646   3 : left fallopian tube and ovary Tissue FALLOPIAN TUBE AND OVARY LEFT SALPINGO-OOPHORECTOMY SURGICAL PATHOLOGY EXAM Roxie Lai MD, MS 2025 1646           Drains and/or Catheters:    [REMOVED] Urethral Catheter Non-latex 16 Fr. (Removed)         Findings: Normal external genitalia. Uterus and cervix normal in appearance. Right tube and ovary normal in appearance. Left ovary enlarged to approximately 6cm, ruptured to sebaceous material and hair, consistent with dermoid cyst. Left fallopian tube normal in appearance. Normal upper abdominal survey and smooth peritoneal surfaces.     Indications: Yomaira Alanis is an 69 y.o. female who is having surgery for Pelvic mass in female [R19.00].     The patient was seen in the preoperative area. The risks, benefits, complications, treatment options, non-operative alternatives, expected recovery and outcomes were discussed with the patient. The possibilities of reaction to medication, pulmonary aspiration, injury to surrounding structures, bleeding, recurrent infection, the need for additional procedures, failure to diagnose a condition, and creating a complication requiring transfusion or operation were discussed with the patient. The patient concurred with the proposed plan, giving informed consent.  The site of surgery was properly noted/marked if necessary per policy. The patient has been actively warmed in preoperative area. Preoperative antibiotics are not indicated. Venous thrombosis prophylaxis have been ordered including bilateral sequential compression devices and chemical prophylaxis    Procedure Details:   After informed consent was obtained, the patient was taken to the operating room. General anesthesia was administered. Patient was then positioned in the dorsal lithotomy position with arms carefully tucked. Patient was prepped and draped in the usual sterile fashion. A Castro catheter was placed.     A vertical incision was made supraumbilically and carried to the fascia with hemostats and S retractors. The fascia was elevated with 2 Dorinda clamps and then incised with a scalpel. The fascia was then tagged with 0-vicryl suture. The  peritoneum was identified, elevated with Dorinda clamps and entered sharply. The Mark port was placed and intraperitoneal entry was confirmed under direct visualization. Pneumoperitoneum was established. Initial survey of the abdomen found it to be devoid of trauma from entry. An upper abdominal survey was unremarkable.    The patient was then placed in Trendelenburg. Two 5 mm ports were placed in the right abdomen and one in the left lowe abdomen. A pelvic survey showed a  normal appearing uterus and right fallopian tubes and ovary. The left ovary was enlarged to about 6cm as above. Pelvic washings were obtained. The right pelvic sidewall was opened with the Bovie cautery. The infundibulopelvic ligament was isolated, sealed, and transected making sure the ureter was free of all pedicles. The remaining uteroovarian attachments, as well as the tubal attachments, were sealed and transected, and the right fallopian tube and ovary were placed in the posterior cul-de-sac.     The left pelvic sidewall was opened in a similar fashion. The ureter was also then identified.  The left infundibulopelvic ligament was then isolated, sealed, and transected making sure the ureter was free of all pedicles. The remaining uteroovarian attachments, as well as the tubal attachments, were sealed and transected.    The 10mm laparoscope was then exchanged to a 5mm laparoscope and introduced through the left lower quadrant port. An endocatch bag was introduced through the Mark port site and the right and left ovaries and fallopian tubes were placed within the bag. The specimen was unable to be removed through the port without rupture to sebaceous material and hair, consistent with a dermoid. After the endocatch bag and cyst were removed, remaining sebaceous material that had spilled into the abdomen was removed through the endocatch bag and the abdomen thoroughly irrigated. The endocatch bag and specimens were removed through the 10mm  port site and sent to pathology for permanent section.    The abdomen and pelvis were then irrigated. Good hemostasis was noted. All instruments and trocars were removed under direct visualization. The umbilical port was closed using an additional figure of eight suture.  The skin of all port sites was closed with 4-0 Monocryl. Steristrips were placed.    Castro catheter was removed. All counts were correct, the patient tolerated the procedure well. Dr. Lai was present for the entire procedure. The patient was taken to PACU in stable condition.    Menopause Status: This patient is post-menopausal.  Evidence of Infection: No   Complications:  None; patient tolerated the procedure well.    Disposition: PACU - hemodynamically stable.  Condition: stable       Abdi Stallings MD  Gynecologic Oncology Fellow      Additional Details: None    Attending Attestation: I was present for the entirety of the procedure(s).     Roxie Lai MD, MS     Roxie Lai  Phone Number: [phone]

## 2025-07-07 NOTE — ANESTHESIA PREPROCEDURE EVALUATION
Patient: Yomaira Alanis    Procedure Information       Date/Time: 07/07/25 7555    Procedure: Laparoscopic removal of pelvic mass, bilateral salpingo-oophorecytomy, possible staging, any other indicated procedures (Bilateral)    Location: [facility] OR 03 / [facility] OR    Surgeons: Roxie Lai MD, MS            Relevant Problems   Anesthesia (within normal limits)      Cardiac  Pt treated with metoprolol for epidodes of tachycardia.   (+) HLD (hyperlipidemia)      Pulmonary (within normal limits)      Neuro   (+) Depression      /Renal (within normal limits)      Liver (within normal limits)      Endocrine (within normal limits)   Pt is NPO after midnight.  No anesthesia complications.      Clinical information reviewed:    Allergies  Meds               NPO Detail:  NPO/Void Status  Carbohydrate Drink Given Prior to Surgery? : N  Date of Last Liquid: 07/07/25  Time of Last Liquid: 0830 (water with meds)  Date of Last Solid: 07/06/25  Time of Last Solid: 1900  Last Intake Type: Clear fluids  Time of Last Void: 1330         Physical Exam    Airway  Mallampati: II  TM distance: >3 FB  Neck ROM: full  Mouth opening: 3 or more finger widths     Cardiovascular - normal exam  Rhythm: regular  Rate: normal     Dental - normal exam     Pulmonary - normal examBreath sounds clear to auscultation     Abdominal Abdomen: soft             Anesthesia Plan    History of general anesthesia?: yes  History of complications of general anesthesia?: no    ASA 2     general     intravenous induction   Postoperative pain plan includes opioids.  Anesthetic plan and risks discussed with patient.  Use of blood products discussed with patient who.    Plan discussed with resident and attending.

## 2025-07-07 NOTE — ANESTHESIA PROCEDURE NOTES
Airway  Date/Time: 7/7/2025 3:20 PM  Reason: elective    Airway not difficult    Staffing  Performed: resident   Authorized by: Azael Burgos MD    Performed by: Clover Bautista MD  Patient location during procedure: OR    Patient Condition  Indications for airway management: anesthesia  Patient position: sniffing  Planned trial extubation  Sedation level: deep     Final Airway Details   Preoxygenated: yes  Final airway type: endotracheal airway  Successful airway: ETT  Cuffed: yes   Successful intubation technique: direct laryngoscopy  Adjuncts used in placement: intubating stylet  Endotracheal tube insertion site: oral  Blade: Gregoria  Blade size: #3  ETT size (mm): 7.0  Cormack-Lehane Classification: grade IIb - view of arytenoids or posterior of glottis only  Placement verified by: chest auscultation and capnometry   Inital cuff pressure (cm H2O): 5  Measured from: lips  ETT to lips (cm): 21  Number of attempts at approach: 1

## 2025-07-08 LAB
LABORATORY COMMENT REPORT: NORMAL
LABORATORY COMMENT REPORT: NORMAL
PATH REPORT.FINAL DX SPEC: NORMAL
PATH REPORT.GROSS SPEC: NORMAL
PATH REPORT.RELEVANT HX SPEC: NORMAL
PATH REPORT.TOTAL CANCER: NORMAL

## 2025-07-08 ASSESSMENT — PAIN SCALES - GENERAL: PAIN_LEVEL: 2

## 2025-07-08 NOTE — ANESTHESIA POSTPROCEDURE EVALUATION
Patient: Yomaira Alanis    Procedure Summary       Date: 07/07/25 Room / Location: [facility] OR 03 / [facility]    Anesthesia Start: 1511 Anesthesia Stop: 1737    Procedure: Laparoscopic removal of pelvic mass, bilateral salpingo-oophorecytomy (Bilateral) Diagnosis:       Pelvic mass in female      (Pelvic mass in female [R19.00])    Surgeons: Roxie Lai MD, MS Responsible Provider: Azael Burgos MD    Anesthesia Type: general ASA Status: 2            Anesthesia Type: general    Vitals Value Taken Time   /64 07/07/25 19:00   Temp 36.2 °C (97.2 °F) 07/07/25 17:37   Pulse 59 07/07/25 19:00   Resp 15 07/07/25 19:00   SpO2 97 % 07/07/25 19:00       Anesthesia Post Evaluation    Patient location during evaluation: PACU  Patient participation: complete - patient participated  Level of consciousness: awake and alert  Pain score: 2  Pain management: adequate  Airway patency: patent  Cardiovascular status: acceptable  Respiratory status: acceptable  Hydration status: acceptable  Postoperative Nausea and Vomiting: mild        No notable events documented.

## 2025-07-21 NOTE — PROGRESS NOTES
Patient ID: Yomaira Alanis is a 69 y.o. female.  Referring Physician: No referring provider defined for this encounter.  Primary Care Provider: Tiffani Maldonado MD      Subjective      Interval Hx: now POD#15 from removal of pelvic mass, BSO. Recovering well, back to normal activities. Incision sites healing well, 1 incision with exposed suture causing itching. Otherwise, no vaginal bleeding, fevers, abdominal pain, trouble voiding or having BMs.     HPI  69 y.o. presenting with a simple appearing pelvic mass    She was experiencing dysuria and urinary frequency, and she was admitted for a UTI and experienced vaginal bleeding. Notes worsening UU and urinary frequency which concerns her she may have not fully recovered from her UTI. Otherwise she is going to the bathroom normally and eating and drinking normally. Denies experiencing abdominal pain. Daily regimen includes Paxil, Prevacid, Buspar, Florastor and vitamins.     They deny fever, chills, constipation, diarrhea, vaginal bleeding, abdominal pain, nausea, vomiting, or any other symptoms other than those listed in the interval history.    PMH:  Medical History[1]     PSH:  Surgical History[2]   Carpal Tunnel release  Laparoscopic BSO    OBHx:  The patient is a G0. She underwent menopause in her early 40s. Denies PMB. She used COCs for >10 years. She did not use HRT.    Social:  They deny alcohol, tobacco, and recreational drug use. The patient lives at home with her . The patient works is a retired ..     FamHx:  Mother lived to be 107 with Hx of breast cancer in her 90s. Maternal aunt Hx of uterine cancer. Maternal aunt Hx of breast cancer.  Their history is otherwise negative for a history of breast, ovarian, uterine, colon, pancreatic, and GI cancer.     Screening:  Cervical cancer: NILM age 65  Mammogram:  12/2024  Colonoscopy: 5-10 years ago      Review of Systems   Constitutional:  Negative for chills and fever.  "  Respiratory:  Negative for cough and shortness of breath.    Cardiovascular:  Negative for chest pain.   Gastrointestinal:  Negative for abdominal distention and abdominal pain.   Genitourinary:  Negative for difficulty urinating and vaginal bleeding.    Skin:  Positive for itching.   All other systems reviewed and are negative.     Objective   BSA: 1.94 meters squared  /69 (BP Location: Left arm, Patient Position: Sitting, BP Cuff Size: Adult)   Pulse 54   Temp 36.4 °C (97.5 °F) (Temporal)   Resp 16   Ht (S) 1.579 m (5' 2.17\")   Wt 85.7 kg (188 lb 15 oz)   SpO2 97%   BMI 34.37 kg/m²      Family History[3]    Yomaira Alanis  reports that she has never smoked. She has never used smokeless tobacco.  She  reports that she does not currently use alcohol.  She  reports no history of drug use.    Physical Exam  Constitutional:       General: She is not in acute distress.     Appearance: Normal appearance. She is not toxic-appearing.   HENT:      Head: Normocephalic.      Mouth/Throat:      Mouth: Mucous membranes are moist.      Pharynx: Oropharynx is clear.     Eyes:      Extraocular Movements: Extraocular movements intact.      Conjunctiva/sclera: Conjunctivae normal.      Pupils: Pupils are equal, round, and reactive to light.       Cardiovascular:      Rate and Rhythm: Normal rate and regular rhythm.      Heart sounds: Normal heart sounds. No murmur heard.     No friction rub. No gallop.   Pulmonary:      Effort: Pulmonary effort is normal.      Breath sounds: Normal breath sounds. No wheezing or rhonchi.   Abdominal:      General: Bowel sounds are normal. There is no distension.      Palpations: Abdomen is soft.      Tenderness: There is no abdominal tenderness.      Comments: 4 well healing laparoscopic incisions, RLQ incision with exposed suture     Musculoskeletal:         General: Normal range of motion.      Cervical back: Normal range of motion.     Skin:     General: Skin is warm. "     Neurological:      General: No focal deficit present.      Mental Status: She is alert and oriented to person, place, and time.     Psychiatric:         Mood and Affect: Mood normal.         Behavior: Behavior normal.       ECG 12 Lead  Normal sinus rhythm  Low voltage QRS  Cannot rule out Anterior infarct (cited on or before 19-MAR-2025)  Abnormal ECG  When compared with ECG of 19-MAR-2025 00:00,  QT has lengthened  Confirmed by Shiraz Patrick ([id number]) on 6/5/2025 12:29:25 PM  ECG 12 lead (Clinic Performed)  An EKG on 6/5/2025 showed sinus rhythm at 73 bpm with low voltage   throughout, and poor R wave progression from leads V1 through V4.    Mehdi Barakat MD    Performance Status:  Asymptomatic    Assessment/Plan      Oncology History    No history exists.     69 y.o. presenting with a simple appearing pelvic mass, now s/p BSO with dermoid intra-op, here for management    # Post-op  - Recovering well  - Discussed ongoing restrictions (no lifting more than 10-15lbs, nothing per vagina, no soaking)  - Final path pending, intra-op consistent with dermoid  - Will call with final path    Patient seen and discussed with Dr. Ming Garcia, MS4  [facility] [employer]     I, or a resident under my supervision, was present with the medical student who participated in the documentation of this note.  I have personally seen and examined the patient and performed the medical decision-making components. I have reviewed the medical student documentation and/or resident documentation and verified the findings in the note as written with additions or exceptions as stated in the body of the note.    Roxie Lai MD, MS        [1]   Past Medical History:  Diagnosis Date    Anxiety     Arrhythmia April 2025    Nonsustained intra-atrial tachycardia    Depression     HLD (hyperlipidemia)     Obesity Lifetime    Yes    Right ovarian cyst    [2]   Past Surgical History:  Procedure  Laterality Date    CARPAL TUNNEL RELEASE Bilateral     Both hands    CATARACT EXTRACTION  2023    COLONOSCOPY      OTHER SURGICAL HISTORY     [3]   Family History  Problem Relation Name Age of Onset    Breast cancer Mother Lindsay Curry 88    Cancer Mother Lindsay Curry     Heart attack Mother Lindsay Curry     No Known Problems Father      Cancer Brother Michele Antoinette     Cancer Brother Michele Antoinette     Depression Maternal Grandmother Quynh Eliasherrie     Stroke Maternal Grandfather      Ovarian cancer Mother's Sister Leighton Kelly 80    Cancer Mother's Sister Leighton Kelly

## 2025-07-22 ENCOUNTER — OFFICE VISIT (OUTPATIENT)
Dept: GYNECOLOGIC ONCOLOGY | Facility: CLINIC | Age: 70
End: 2025-07-22
Payer: MEDICARE

## 2025-07-22 VITALS
OXYGEN SATURATION: 97 % | TEMPERATURE: 97.5 F | HEART RATE: 54 BPM | RESPIRATION RATE: 16 BRPM | DIASTOLIC BLOOD PRESSURE: 69 MMHG | WEIGHT: 188.93 LBS | SYSTOLIC BLOOD PRESSURE: 115 MMHG | HEIGHT: 62 IN | BODY MASS INDEX: 34.77 KG/M2

## 2025-07-22 DIAGNOSIS — Z90.722 S/P BSO (BILATERAL SALPINGO-OOPHORECTOMY): ICD-10-CM

## 2025-07-22 DIAGNOSIS — R19.00 PELVIC MASS IN FEMALE: Primary | ICD-10-CM

## 2025-07-22 PROCEDURE — 3008F BODY MASS INDEX DOCD: CPT | Performed by: STUDENT IN AN ORGANIZED HEALTH CARE EDUCATION/TRAINING PROGRAM

## 2025-07-22 PROCEDURE — 1126F AMNT PAIN NOTED NONE PRSNT: CPT | Performed by: STUDENT IN AN ORGANIZED HEALTH CARE EDUCATION/TRAINING PROGRAM

## 2025-07-22 PROCEDURE — 1159F MED LIST DOCD IN RCRD: CPT | Performed by: STUDENT IN AN ORGANIZED HEALTH CARE EDUCATION/TRAINING PROGRAM

## 2025-07-22 PROCEDURE — 99211 OFF/OP EST MAY X REQ PHY/QHP: CPT | Performed by: STUDENT IN AN ORGANIZED HEALTH CARE EDUCATION/TRAINING PROGRAM

## 2025-07-22 PROCEDURE — 1160F RVW MEDS BY RX/DR IN RCRD: CPT | Performed by: STUDENT IN AN ORGANIZED HEALTH CARE EDUCATION/TRAINING PROGRAM

## 2025-07-22 ASSESSMENT — ENCOUNTER SYMPTOMS
ABDOMINAL PAIN: 0
SHORTNESS OF BREATH: 0
COUGH: 0
FEVER: 0
DIFFICULTY URINATING: 0
ABDOMINAL DISTENTION: 0
CHILLS: 0

## 2025-07-22 ASSESSMENT — PAIN SCALES - GENERAL: PAINLEVEL_OUTOF10: 0-NO PAIN

## 2025-07-24 LAB
LAB AP ASR DISCLAIMER: NORMAL
LABORATORY COMMENT REPORT: NORMAL
PATH REPORT.COMMENTS IMP SPEC: NORMAL
PATH REPORT.FINAL DX SPEC: NORMAL
PATH REPORT.GROSS SPEC: NORMAL
PATH REPORT.RELEVANT HX SPEC: NORMAL
PATH REPORT.TOTAL CANCER: NORMAL

## 2025-07-24 PROCEDURE — 88342 IMHCHEM/IMCYTCHM 1ST ANTB: CPT | Performed by: STUDENT IN AN ORGANIZED HEALTH CARE EDUCATION/TRAINING PROGRAM

## 2025-07-24 PROCEDURE — 88360 TUMOR IMMUNOHISTOCHEM/MANUAL: CPT | Performed by: STUDENT IN AN ORGANIZED HEALTH CARE EDUCATION/TRAINING PROGRAM

## 2025-07-24 PROCEDURE — 88341 IMHCHEM/IMCYTCHM EA ADD ANTB: CPT | Performed by: STUDENT IN AN ORGANIZED HEALTH CARE EDUCATION/TRAINING PROGRAM

## 2025-07-26 ENCOUNTER — OFFICE VISIT (OUTPATIENT)
Dept: URGENT CARE | Age: 70
End: 2025-07-26
Payer: MEDICARE

## 2025-07-26 VITALS
RESPIRATION RATE: 16 BRPM | DIASTOLIC BLOOD PRESSURE: 61 MMHG | TEMPERATURE: 97.8 F | HEART RATE: 69 BPM | OXYGEN SATURATION: 98 % | SYSTOLIC BLOOD PRESSURE: 129 MMHG

## 2025-07-26 DIAGNOSIS — R30.0 BURNING WITH URINATION: ICD-10-CM

## 2025-07-26 DIAGNOSIS — N30.01 ACUTE CYSTITIS WITH HEMATURIA: Primary | ICD-10-CM

## 2025-07-26 LAB
POC APPEARANCE, URINE: ABNORMAL
POC BILIRUBIN, URINE: ABNORMAL
POC BLOOD, URINE: ABNORMAL
POC COLOR, URINE: YELLOW
POC GLUCOSE, URINE: NEGATIVE MG/DL
POC KETONES, URINE: ABNORMAL MG/DL
POC LEUKOCYTES, URINE: ABNORMAL
POC NITRITE,URINE: NEGATIVE
POC PH, URINE: 6 PH
POC PROTEIN, URINE: NEGATIVE MG/DL
POC SPECIFIC GRAVITY, URINE: 1.02
POC UROBILINOGEN, URINE: 0.2 EU/DL

## 2025-07-26 RX ORDER — CEPHALEXIN 500 MG/1
500 CAPSULE ORAL 2 TIMES DAILY
Qty: 14 CAPSULE | Refills: 0 | Status: SHIPPED | OUTPATIENT
Start: 2025-07-26 | End: 2025-08-02

## 2025-07-26 ASSESSMENT — ENCOUNTER SYMPTOMS
FLANK PAIN: 0
VOMITING: 0
DYSURIA: 1
FEVER: 0
NAUSEA: 0
ABDOMINAL PAIN: 0

## 2025-07-26 NOTE — PROGRESS NOTES
Subjective   Patient ID: Yomaira Alanis is a 69 y.o. female. They present today with a chief complaint of urinary burning/urgency.    History of Present Illness    History provided by:  Patient   used: No    Difficulty Urinating  Pain quality:  Burning  Pain severity:  Mild  Onset quality:  Sudden  Duration:  12 hours  Timing:  Intermittent  Progression:  Waxing and waning  Chronicity:  New  Recent urinary tract infections: no    Relieved by:  Nothing  Worsened by:  Nothing  Ineffective treatments:  None tried  Urinary symptoms: hematuria    Urinary symptoms: no discolored urine, no foul-smelling urine, no frequent urination, no hesitancy and no bladder incontinence    Associated symptoms: no abdominal pain, no fever, no flank pain, no genital lesions, no nausea, no vaginal discharge and no vomiting        Past Medical History  Allergies as of 07/26/2025    (No Known Allergies)       Prescriptions Prior to Admission[1]     Medical History[2]    Surgical History[3]     reports that she has never smoked. She has never used smokeless tobacco. She reports that she does not currently use alcohol. She reports that she does not use drugs.    Review of Systems  Review of Systems   Constitutional:  Negative for fever.   Gastrointestinal:  Negative for abdominal pain, nausea and vomiting.   Genitourinary:  Positive for dysuria. Negative for flank pain and vaginal discharge.            Objective    Vitals:    07/26/25 1511   BP: 129/61   Pulse: 69   Resp: 16   Temp: 36.6 °C (97.8 °F)   SpO2: 98%     No LMP recorded. Patient is postmenopausal.    Physical Exam  Vitals and nursing note reviewed.   Constitutional:       Appearance: Normal appearance.   HENT:      Head: Normocephalic and atraumatic.     Cardiovascular:      Rate and Rhythm: Normal rate and regular rhythm.   Pulmonary:      Effort: Pulmonary effort is normal.      Breath sounds: Normal breath sounds.   Abdominal:      General: Abdomen is  protuberant.      Tenderness: There is no abdominal tenderness. There is no right CVA tenderness or left CVA tenderness.     Neurological:      Mental Status: She is alert.         Procedures    Point of Care Test & Imaging Results from this visit  Results for orders placed or performed in visit on 07/26/25   POCT UA Automated manually resulted   Result Value Ref Range    POC Color, Urine Yellow Straw, Yellow, Light-Yellow    POC Appearance, Urine Cloudy (A) Clear    POC Glucose, Urine NEGATIVE NEGATIVE mg/dl    POC Bilirubin, Urine SMALL (1+) (A) NEGATIVE    POC Ketones, Urine TRACE (A) NEGATIVE mg/dl    POC Specific Gravity, Urine 1.025 1.005 - 1.035    POC Blood, Urine LARGE (3+) (A) NEGATIVE    POC PH, Urine 6.0 No Reference Range Established PH    POC Protein, Urine NEGATIVE NEGATIVE mg/dl    POC Urobilinogen, Urine 0.2 0.2, 1.0 EU/DL    Poc Nitrite, Urine NEGATIVE NEGATIVE    POC Leukocytes, Urine MODERATE (2+) (A) NEGATIVE      Imaging  No results found.    Cardiology, Vascular, and Other Imaging  No other imaging results found for the past 2 days      Diagnostic study results (if any) were reviewed by JORGE Mcgarry.    Assessment/Plan   Allergies, medications, history, and pertinent labs/EKGs/Imaging reviewed by JORGE Mcgarry.     Medical Decision Making  Urine specimen was sent out for culture and sensitivity. Will adjust the antibiotic treatment as needed.   Take the antibiotic with food.  Eat yogurt or take probiotic once a day.  Symptoms should improve in 2 to 3 days.   --- Drink a lot of fluid, 3 to 4 quarts a day. Cranberry juice is especially recommended, in addition to large amounts of water.  --- Avoid alcohol caffeine, tea and carbonated beverages (Coke®, 7-Up®, etc). They tend to irritate the bladder.  --- Ibuprofen (Advil® or Motrin®) or acetaminophen (Tylenol®) may be used for temperature and/or discomfort.  To prevent severe infection, follow up immediately if you:  --- Develop  severe back pain or lower abdominal pain.  --- Develop chills and fever.  --- Develop nausea or vomiting.  --- Have continued burning or discomfort with urination.  Pt verbalized understanding of the instructions and left in stable condition.      Orders and Diagnoses  Diagnoses and all orders for this visit:  Acute cystitis with hematuria  -     cephalexin (Keflex) 500 mg capsule; Take 1 capsule (500 mg) by mouth 2 times a day for 7 days.  Burning with urination  -     POCT UA Automated manually resulted  -     Urine Culture      Medical Admin Record      Patient disposition: Home    Electronically signed by JORGE Mcgarry  3:37 PM           [1] (Not in a hospital admission)   [2]   Past Medical History:  Diagnosis Date    Anxiety     Arrhythmia April 2025    Nonsustained intra-atrial tachycardia    Depression     HLD (hyperlipidemia)     Obesity Lifetime    Yes    Right ovarian cyst    [3]   Past Surgical History:  Procedure Laterality Date    CARPAL TUNNEL RELEASE Bilateral     Both hands    CATARACT EXTRACTION  2023    COLONOSCOPY      OTHER SURGICAL HISTORY

## 2025-07-29 LAB — BACTERIA UR CULT: ABNORMAL

## 2025-08-01 ENCOUNTER — TELEPHONE (OUTPATIENT)
Dept: CARDIOLOGY | Facility: HOSPITAL | Age: 70
End: 2025-08-01
Payer: MEDICARE

## 2025-08-01 NOTE — TELEPHONE ENCOUNTER
Called and confirmed patients appointment and requested an updated list of medications be brought with them.   Kindra Georges MA

## 2025-08-06 NOTE — PROGRESS NOTES
Chief Complaint/Reason for Visit:  Follow-up 3 month cardiovascular follow up    History Of Present Illness:    Yomaira Alanis is a 69 y.o. female that presents to the office for 3 month follow up.    Taking medications as prescribed.     PMH significant for hyperlipidemia and depression.    Past Medical History:  She has a past medical history of Anxiety, Arrhythmia (April 2025), Depression, HLD (hyperlipidemia), Obesity (Lifetime), and Right ovarian cyst.    Past Surgical History:  She has a past surgical history that includes Carpal tunnel release (Bilateral); Other surgical history; Colonoscopy; and Cataract extraction (2023).      Social History:  She reports that she has never smoked. She has never used smokeless tobacco. She reports that she does not currently use alcohol. She reports that she does not use drugs.    Family History:  Family History[1]     Allergies:  Patient has no known allergies.    Review of Systems   Constitutional: Negative for chills and fever.   Cardiovascular:  Negative for chest pain, dyspnea on exertion, irregular heartbeat, leg swelling, near-syncope, orthopnea, palpitations (improved after starting metoprolol and stress level decreased) and syncope.   Respiratory:  Negative for cough, shortness of breath and wheezing.    Gastrointestinal:  Negative for hematochezia, melena, nausea and vomiting.   Genitourinary:  Negative for hematuria.   Psychiatric/Behavioral:  Negative for altered mental status.        Objective      Vitals reviewed.   Constitutional:       Appearance: Not in distress.   Pulmonary:      Effort: Pulmonary effort is normal.      Breath sounds: Normal breath sounds.   Cardiovascular:      PMI at left midclavicular line. Normal rate. Regular rhythm. S1 with normal intensity. S2 with normal intensity.       Murmurs: There is no murmur.   Edema:     Peripheral edema absent.   Abdominal:      General: Bowel sounds are normal.   Neurological:      Mental Status: Alert  "and oriented to person, place and time.       Current Outpatient Medications   Medication Instructions    lansoprazole (PREVACID) 15 mg, Daily before breakfast    metoprolol succinate XL (TOPROL-XL) 25 mg, oral, Daily, Do not crush or chew.    multivitamin tablet 1 tablet, Daily    omega-3 acid ethyl esters (LOVAZA) 1 g, Daily    PARoxetine (PAXIL) 20 mg, Daily before breakfast    simvastatin (ZOCOR) 20 mg, Nightly        Reviewed the following Labs:  CBC -  Lab Results   Component Value Date    WBC 7.4 06/20/2025    HGB 13.9 06/20/2025    HCT 44.5 06/20/2025    MCV 85 06/20/2025     06/20/2025       RENAL FUNCTION PANEL -   Lab Results   Component Value Date    GLUCOSE 86 06/20/2025     06/20/2025    K 4.7 06/20/2025     06/20/2025    CO2 28 06/20/2025    ANIONGAP 14 06/20/2025    BUN 15 06/20/2025    CREATININE 0.72 06/20/2025    CALCIUM 9.7 06/20/2025    ALBUMIN 4.4 05/14/2025        CMP -  Lab Results   Component Value Date    CALCIUM 9.7 06/20/2025    PROT 7.1 05/14/2025    ALBUMIN 4.4 05/14/2025    AST 18 05/14/2025    ALT 14 05/14/2025    ALKPHOS 65 05/14/2025    BILITOT 0.5 05/14/2025       LIPID PANEL -   No results found for: \"CHOL\", \"TRIG\", \"HDL\", \"CHHDL\", \"LDLF\", \"VLDL\", \"NHDL\"  No results found for: \"LDLCALC\"    Lab Results   Component Value Date    BNP 20 03/17/2025       Lab Results   Component Value Date    TSH 3.50 03/18/2025       No results found for this or any previous visit.     Reviewed the following Cardiology Tests:    Holter monitor 3/19/25-4/1/25  Patient had a min HR of 53 bpm, max HR of 190 bpm, and avg HR of 70 bpm. Predominant underlying rhythm was Sinus Rhythm. 9 Supraventricular Tachycardia runs occurred, the run with the fastest interval lasting 18 beats with a max rate of 190 bpm, the longest lasting 19.5 secs with an avg rate of 156 bpm. Isolated SVEs were rare (<1.0%), SVE Couplets were rare (<1.0%), and SVE Triplets were rare (<1.0%). Isolated VEs were rare " "(<1.0%), and no VE Couplets or VE Triplets were present.    Stress echocardiogram 3/18/25  1. The resting ejection fraction was estimated at 55 to 60% with a peak exercise ejection fraction estimated at 60 to 65%.   2. Normal global left ventricular systolic function.   3. Adequate level of stress achieved.   4. The Tucker score is 3.   5. There were no stress-induced wall motion abnormalities. This is a negative stress echo test for ischemia.    Echo 3/18/25:   1. The left ventricular systolic function is normal, with a Kohler's biplane calculated ejection fraction of 63%.   2. Spectral Doppler shows a Grade I (impaired relaxation pattern) of left ventricular diastolic filling with normal left atrial filling pressure.   3. There is normal right ventricular global systolic function.   4. Mildly elevated right ventricular systolic pressure.   5. There is moderately increased posterior left ventricular wall thickness.    Visit Vitals  /78 (BP Location: Right arm)   Pulse 66   Ht 1.575 m (5' 2\")   Wt 86.2 kg (190 lb)   BMI 34.75 kg/m²   OB Status Postmenopausal   Smoking Status Never   BSA 1.94 m²       Assessment/Plan   The primary encounter diagnosis was Paroxysmal supraventricular tachycardia. Diagnoses of Palpitations and SVT (supraventricular tachycardia) were also pertinent to this visit.    1. pSVT  Holter monitor March/April 2025 -  9 Supraventricular Tachycardia runs occurred, the longest lasting 19.5 secs with an avg rate of 156 bpm.  Stress echo March 2025 with no ischemia  TTE March 2025 with LVEF 63%.   Seen by EP June 2025 and episodic palpitations were thought to be 2/2 intra-atrial tachycardia from an irritable focus.  EP started on metoprolol succinate 25 mg daily as episodes are nonsustained and to prevent future AF that may be triggered by atrial tachycardia.  If she should ever have sustained atrial arrhythmias in the future, refer back to EP to discuss antiarrhythmic therapy or ablation " options.  Continue metoprolol succinate 25 mg daily  She does have CT calcium score pending for 8/18/25.  She will let us know if PCP tells her this is abnormal and if she needs sooner f/u.      PRAVIN Samson-CNP          [1]   Family History  Problem Relation Name Age of Onset    Breast cancer Mother Lindsay Antoinette 88    Cancer Mother Lindsay Curry     Heart attack Mother Lindsay Curry     No Known Problems Father      Cancer Brother Michele Antoinette     Cancer Brother Michele Curry     Depression Maternal Grandmother Quynh Antoinette     Stroke Maternal Grandfather      Ovarian cancer Mother's Sister Leighton Kelly 80    Cancer Mother's Sister Leighton Kelly

## 2025-08-06 NOTE — PATIENT INSTRUCTIONS
Recommend Mediterranean style of eating  Continue current medications  Follow-up with Dr. Patrick in 1 year  If you have any questions or cardiac concerns, please call our office at [phone].

## 2025-08-07 PROBLEM — I47.10 PAROXYSMAL SUPRAVENTRICULAR TACHYCARDIA: Status: ACTIVE | Noted: 2025-08-07

## 2025-08-07 ASSESSMENT — ENCOUNTER SYMPTOMS
NAUSEA: 0
ALTERED MENTAL STATUS: 0
ORTHOPNEA: 0
FEVER: 0
DYSPNEA ON EXERTION: 0
CHILLS: 0
IRREGULAR HEARTBEAT: 0
WHEEZING: 0
HEMATURIA: 0
VOMITING: 0
HEMATOCHEZIA: 0
SYNCOPE: 0
COUGH: 0
NEAR-SYNCOPE: 0
SHORTNESS OF BREATH: 0

## 2025-08-08 ENCOUNTER — OFFICE VISIT (OUTPATIENT)
Dept: CARDIOLOGY | Facility: HOSPITAL | Age: 70
End: 2025-08-08
Payer: MEDICARE

## 2025-08-08 VITALS
BODY MASS INDEX: 34.96 KG/M2 | HEART RATE: 66 BPM | SYSTOLIC BLOOD PRESSURE: 120 MMHG | WEIGHT: 190 LBS | DIASTOLIC BLOOD PRESSURE: 78 MMHG | HEIGHT: 62 IN

## 2025-08-08 DIAGNOSIS — I47.10 SVT (SUPRAVENTRICULAR TACHYCARDIA): ICD-10-CM

## 2025-08-08 DIAGNOSIS — I47.10 PAROXYSMAL SUPRAVENTRICULAR TACHYCARDIA: Primary | ICD-10-CM

## 2025-08-08 DIAGNOSIS — R00.2 PALPITATIONS: ICD-10-CM

## 2025-08-08 PROCEDURE — 99212 OFFICE O/P EST SF 10 MIN: CPT

## 2025-08-08 PROCEDURE — 3008F BODY MASS INDEX DOCD: CPT | Performed by: NURSE PRACTITIONER

## 2025-08-08 PROCEDURE — 1159F MED LIST DOCD IN RCRD: CPT | Performed by: NURSE PRACTITIONER

## 2025-08-08 PROCEDURE — 99213 OFFICE O/P EST LOW 20 MIN: CPT | Performed by: NURSE PRACTITIONER

## 2025-08-08 PROCEDURE — 1160F RVW MEDS BY RX/DR IN RCRD: CPT | Performed by: NURSE PRACTITIONER

## 2025-08-08 PROCEDURE — 1036F TOBACCO NON-USER: CPT | Performed by: NURSE PRACTITIONER

## 2025-08-08 ASSESSMENT — ENCOUNTER SYMPTOMS
PALPITATIONS: 0
LOSS OF SENSATION IN FEET: 0
DEPRESSION: 0
OCCASIONAL FEELINGS OF UNSTEADINESS: 0

## 2025-08-18 ENCOUNTER — HOSPITAL ENCOUNTER (OUTPATIENT)
Dept: RADIOLOGY | Facility: CLINIC | Age: 70
Discharge: HOME | End: 2025-08-18
Payer: MEDICARE

## 2025-08-18 DIAGNOSIS — R07.9 CHEST PAIN, UNSPECIFIED: ICD-10-CM

## 2025-08-18 PROCEDURE — 75571 CT HRT W/O DYE W/CA TEST: CPT

## (undated) DEVICE — DRESSING, ADHESIVE, ISLAND, TELFA, 4 X 10 IN

## (undated) DEVICE — SUTURE, VICRYL, 0, 27 IN, UR-6, VIOLET

## (undated) DEVICE — TRAP, SPECIMEN, 40 ML

## (undated) DEVICE — SYSTEM, FIOS FIRST ENTRY, 5 X 100MM, KII ADVANCED FIXATION

## (undated) DEVICE — PREP TRAY, SKIN, DRY, W/GLOVES

## (undated) DEVICE — HOLSTER, JET SAFETY

## (undated) DEVICE — TOWEL, SURGICAL, NEURO, O/R, 16 X 26, BLUE, STERILE

## (undated) DEVICE — COVER, CART, 45 X 27 X 48 IN, CLEAR

## (undated) DEVICE — DRESSING, ADHESIVE, ISLAND, TELFA, 2 X 3.75 IN, LF

## (undated) DEVICE — TUBE SET, PNEUMOCLEAR, SMOKE EVACU, HIGH-FLOW

## (undated) DEVICE — BAG, TISSUE RETRIEVAL, 10MM, ANCHOR

## (undated) DEVICE — SCOPE WARMER, LAPAROSCOPE, BAG ONLY, LF

## (undated) DEVICE — Device

## (undated) DEVICE — TUBE, SALEM SUMP, 16 FR X 48IN, ENFIT

## (undated) DEVICE — SYRINGE, 60 CC, IRRIGATION, BULB, CONTRO-BULB, PAPER POUCH

## (undated) DEVICE — SUTURE, MONOCRYL, 4-0, 18 IN, PS2, UNDYED

## (undated) DEVICE — LIGASURE, V SEALER/DIVIDER  5MM BLUNT TIP

## (undated) DEVICE — REST, HEAD, BAGEL, 9 IN

## (undated) DEVICE — SYRINGE, 20 CC, LUER LOCK

## (undated) DEVICE — TROCAR SYSTEM, BALLOON, KII GELPORT, 12 X 100MM

## (undated) DEVICE — MANIFOLD, 4 PORT NEPTUNE STANDARD